# Patient Record
Sex: FEMALE | Race: BLACK OR AFRICAN AMERICAN | NOT HISPANIC OR LATINO | Employment: FULL TIME | URBAN - METROPOLITAN AREA
[De-identification: names, ages, dates, MRNs, and addresses within clinical notes are randomized per-mention and may not be internally consistent; named-entity substitution may affect disease eponyms.]

---

## 2018-01-22 ENCOUNTER — HOSPITAL ENCOUNTER (EMERGENCY)
Facility: HOSPITAL | Age: 63
Discharge: HOME | End: 2018-01-22
Attending: EMERGENCY MEDICINE | Admitting: EMERGENCY MEDICINE

## 2018-01-22 ENCOUNTER — APPOINTMENT (OUTPATIENT)
Dept: GENERAL RADIOLOGY | Facility: HOSPITAL | Age: 63
End: 2018-01-22

## 2018-01-22 VITALS
OXYGEN SATURATION: 99 % | HEART RATE: 60 BPM | HEIGHT: 68 IN | WEIGHT: 158 LBS | RESPIRATION RATE: 18 BRPM | SYSTOLIC BLOOD PRESSURE: 121 MMHG | TEMPERATURE: 97.4 F | DIASTOLIC BLOOD PRESSURE: 73 MMHG | BODY MASS INDEX: 23.95 KG/M2

## 2018-01-22 DIAGNOSIS — R07.89 LEFT-SIDED CHEST WALL PAIN: Primary | ICD-10-CM

## 2018-01-22 LAB
ALBUMIN SERPL-MCNC: 4 G/DL (ref 3.5–5.2)
ALBUMIN/GLOB SERPL: 1 G/DL
ALP SERPL-CCNC: 103 U/L (ref 39–117)
ALT SERPL W P-5'-P-CCNC: 20 U/L (ref 1–33)
ANION GAP SERPL CALCULATED.3IONS-SCNC: 12.2 MMOL/L
AST SERPL-CCNC: 18 U/L (ref 1–32)
BASOPHILS # BLD AUTO: 0.01 10*3/MM3 (ref 0–0.2)
BASOPHILS NFR BLD AUTO: 0.2 % (ref 0–1.5)
BILIRUB SERPL-MCNC: 0.4 MG/DL (ref 0.1–1.2)
BUN BLD-MCNC: 12 MG/DL (ref 8–23)
BUN/CREAT SERPL: 16 (ref 7–25)
CALCIUM SPEC-SCNC: 9.3 MG/DL (ref 8.6–10.5)
CHLORIDE SERPL-SCNC: 106 MMOL/L (ref 98–107)
CO2 SERPL-SCNC: 23.8 MMOL/L (ref 22–29)
CREAT BLD-MCNC: 0.75 MG/DL (ref 0.57–1)
DEPRECATED RDW RBC AUTO: 46.2 FL (ref 37–54)
EOSINOPHIL # BLD AUTO: 0.09 10*3/MM3 (ref 0–0.7)
EOSINOPHIL NFR BLD AUTO: 1.9 % (ref 0.3–6.2)
ERYTHROCYTE [DISTWIDTH] IN BLOOD BY AUTOMATED COUNT: 13.9 % (ref 11.7–13)
GFR SERPL CREATININE-BSD FRML MDRD: 78 ML/MIN/1.73
GFR SERPL CREATININE-BSD FRML MDRD: 95 ML/MIN/1.73
GLOBULIN UR ELPH-MCNC: 3.9 GM/DL
GLUCOSE BLD-MCNC: 96 MG/DL (ref 65–99)
HCT VFR BLD AUTO: 38.4 % (ref 35.6–45.5)
HGB BLD-MCNC: 12.5 G/DL (ref 11.9–15.5)
HOLD SPECIMEN: NORMAL
HOLD SPECIMEN: NORMAL
IMM GRANULOCYTES # BLD: 0 10*3/MM3 (ref 0–0.03)
IMM GRANULOCYTES NFR BLD: 0 % (ref 0–0.5)
LYMPHOCYTES # BLD AUTO: 1.39 10*3/MM3 (ref 0.9–4.8)
LYMPHOCYTES NFR BLD AUTO: 29.1 % (ref 19.6–45.3)
MCH RBC QN AUTO: 29.8 PG (ref 26.9–32)
MCHC RBC AUTO-ENTMCNC: 32.6 G/DL (ref 32.4–36.3)
MCV RBC AUTO: 91.4 FL (ref 80.5–98.2)
MONOCYTES # BLD AUTO: 0.31 10*3/MM3 (ref 0.2–1.2)
MONOCYTES NFR BLD AUTO: 6.5 % (ref 5–12)
NEUTROPHILS # BLD AUTO: 2.98 10*3/MM3 (ref 1.9–8.1)
NEUTROPHILS NFR BLD AUTO: 62.3 % (ref 42.7–76)
PLATELET # BLD AUTO: 184 10*3/MM3 (ref 140–500)
PMV BLD AUTO: 10.6 FL (ref 6–12)
POTASSIUM BLD-SCNC: 3.8 MMOL/L (ref 3.5–5.2)
PROT SERPL-MCNC: 7.9 G/DL (ref 6–8.5)
RBC # BLD AUTO: 4.2 10*6/MM3 (ref 3.9–5.2)
SODIUM BLD-SCNC: 142 MMOL/L (ref 136–145)
TROPONIN T SERPL-MCNC: <0.01 NG/ML (ref 0–0.03)
WBC NRBC COR # BLD: 4.78 10*3/MM3 (ref 4.5–10.7)
WHOLE BLOOD HOLD SPECIMEN: NORMAL
WHOLE BLOOD HOLD SPECIMEN: NORMAL

## 2018-01-22 PROCEDURE — 80053 COMPREHEN METABOLIC PANEL: CPT | Performed by: EMERGENCY MEDICINE

## 2018-01-22 PROCEDURE — 84484 ASSAY OF TROPONIN QUANT: CPT | Performed by: EMERGENCY MEDICINE

## 2018-01-22 PROCEDURE — 93010 ELECTROCARDIOGRAM REPORT: CPT | Performed by: INTERNAL MEDICINE

## 2018-01-22 PROCEDURE — 93005 ELECTROCARDIOGRAM TRACING: CPT

## 2018-01-22 PROCEDURE — 85025 COMPLETE CBC W/AUTO DIFF WBC: CPT | Performed by: EMERGENCY MEDICINE

## 2018-01-22 PROCEDURE — 99283 EMERGENCY DEPT VISIT LOW MDM: CPT

## 2018-01-22 PROCEDURE — 71046 X-RAY EXAM CHEST 2 VIEWS: CPT

## 2018-01-22 PROCEDURE — 36415 COLL VENOUS BLD VENIPUNCTURE: CPT | Performed by: EMERGENCY MEDICINE

## 2018-01-22 PROCEDURE — 93005 ELECTROCARDIOGRAM TRACING: CPT | Performed by: EMERGENCY MEDICINE

## 2018-01-22 RX ORDER — ASPIRIN 325 MG
325 TABLET ORAL ONCE
Status: DISCONTINUED | OUTPATIENT
Start: 2018-01-22 | End: 2018-01-22 | Stop reason: HOSPADM

## 2018-01-22 RX ORDER — CYCLOBENZAPRINE HCL 10 MG
10 TABLET ORAL 3 TIMES DAILY
Qty: 90 TABLET | Refills: 0 | Status: SHIPPED | OUTPATIENT
Start: 2018-01-22

## 2018-01-22 RX ORDER — HYDROCODONE BITARTRATE AND ACETAMINOPHEN 5; 325 MG/1; MG/1
1 TABLET ORAL EVERY 6 HOURS PRN
Qty: 20 TABLET | Refills: 0 | Status: SHIPPED | OUTPATIENT
Start: 2018-01-22

## 2018-01-22 RX ORDER — SODIUM CHLORIDE 0.9 % (FLUSH) 0.9 %
10 SYRINGE (ML) INJECTION AS NEEDED
Status: DISCONTINUED | OUTPATIENT
Start: 2018-01-22 | End: 2018-01-22 | Stop reason: HOSPADM

## 2018-01-22 NOTE — ED TRIAGE NOTES
"Patient to er from home with c/o sudden onset of back pain and pain moving into her left side of chest,patient reported no injury to cause this. Patient reported the pain comes in waves.\"   "

## 2018-01-23 NOTE — ED PROVIDER NOTES
EMERGENCY DEPARTMENT ENCOUNTER    CHIEF COMPLAINT  Chief Complaint: back pain  History given by: pt  History limited by: nothing  Room Number: Room/bed info not found  PMD: Vera River MD      HPI:  Pt is a 62 y.o. female who presents complaining of a pounding back pain that began this morning and radiated to her chest. It lasted for around 2 hours. She reports she has had muscle spasms in the past but this is different.    Duration:  Half day  Onset: gradual  Timing: episodic  Location: back  Radiation: to the chest  Quality: pounding  Intensity/Severity: moderate  Progression: improved  Associated Symptoms: chest pain  Aggravating Factors: none  Alleviating Factors: none  Previous Episodes: none  Treatment before arrival: none    PAST MEDICAL HISTORY  Active Ambulatory Problems     Diagnosis Date Noted   • No Active Ambulatory Problems     Resolved Ambulatory Problems     Diagnosis Date Noted   • No Resolved Ambulatory Problems     Past Medical History:   Diagnosis Date   • Vertigo        PAST SURGICAL HISTORY  Past Surgical History:   Procedure Laterality Date   • BREAST SURGERY     • FOOT SURGERY Right    • HYSTERECTOMY         FAMILY HISTORY  History reviewed. No pertinent family history.    SOCIAL HISTORY  Social History     Social History   • Marital status: N/A     Spouse name: N/A   • Number of children: N/A   • Years of education: N/A     Occupational History   • Not on file.     Social History Main Topics   • Smoking status: Never Smoker   • Smokeless tobacco: Never Used   • Alcohol use No   • Drug use: No   • Sexual activity: Not on file     Other Topics Concern   • Not on file     Social History Narrative   • No narrative on file       ALLERGIES  Review of patient's allergies indicates no known allergies.    REVIEW OF SYSTEMS  Review of Systems   Constitutional: Negative for fever.   HENT: Negative for sore throat.    Eyes: Negative.    Respiratory: Negative for cough and shortness of breath.     Cardiovascular: Positive for chest pain.   Gastrointestinal: Negative for abdominal pain, diarrhea and vomiting.   Genitourinary: Negative for dysuria.   Musculoskeletal: Positive for back pain. Negative for neck pain.   Skin: Negative for rash.   Allergic/Immunologic: Negative.    Neurological: Negative for weakness, numbness and headaches.   Hematological: Negative.    Psychiatric/Behavioral: Negative.    All other systems reviewed and are negative.      PHYSICAL EXAM  ED Triage Vitals   Temp Heart Rate Resp BP SpO2   01/22/18 1338 01/22/18 1338 01/22/18 1352 01/22/18 1352 01/22/18 1338   97.4 °F (36.3 °C) 74 20 117/63 97 %      Temp src Heart Rate Source Patient Position BP Location FiO2 (%)   01/22/18 1338 01/22/18 1338 -- -- --   Tympanic Monitor          Physical Exam   Constitutional: She is oriented to person, place, and time and well-developed, well-nourished, and in no distress. No distress.   HENT:   Head: Normocephalic and atraumatic.   Eyes: EOM are normal. Pupils are equal, round, and reactive to light.   Neck: Normal range of motion. Neck supple.   Cardiovascular: Normal rate, regular rhythm and normal heart sounds.    Pulmonary/Chest: Effort normal and breath sounds normal. No respiratory distress.   Abdominal: Soft. There is no tenderness. There is no rebound and no guarding.   Musculoskeletal: Normal range of motion. She exhibits no edema.        Thoracic back: She exhibits tenderness (along left spinous processes) and spasm (mild paraspinal muscle spasms).   Neurological: She is alert and oriented to person, place, and time. She has normal sensation and normal strength.   Skin: Skin is warm and dry. No rash noted.   Psychiatric: Mood and affect normal.   Nursing note and vitals reviewed.      LAB RESULTS  Lab Results (last 24 hours)     Procedure Component Value Units Date/Time    CBC & Differential [[id number]] Collected:  01/22/18 1429    Specimen:  Blood Updated:  01/22/18 1446    Narrative:        The following orders were created for panel order CBC & Differential.  Procedure                               Abnormality         Status                     ---------                               -----------         ------                     CBC Auto Differential[[id number]]        Abnormal            Final result                 Please view results for these tests on the individual orders.    Comprehensive Metabolic Panel [[id number]] Collected:  01/22/18 1429    Specimen:  Blood Updated:  01/22/18 1547     Glucose 96 mg/dL      BUN 12 mg/dL      Creatinine 0.75 mg/dL      Sodium 142 mmol/L      Potassium 3.8 mmol/L      Chloride 106 mmol/L      CO2 23.8 mmol/L      Calcium 9.3 mg/dL      Total Protein 7.9 g/dL      Albumin 4.00 g/dL      ALT (SGPT) 20 U/L      AST (SGOT) 18 U/L      Alkaline Phosphatase 103 U/L      Total Bilirubin 0.4 mg/dL      eGFR Non African Amer 78 mL/min/1.73      eGFR  African Amer 95 mL/min/1.73      Globulin 3.9 gm/dL      A/G Ratio 1.0 g/dL      BUN/Creatinine Ratio 16.0     Anion Gap 12.2 mmol/L     Troponin [[id number]]  (Normal) Collected:  01/22/18 1429    Specimen:  Blood Updated:  01/22/18 1545     Troponin T <0.010 ng/mL     Narrative:       Troponin T Reference Ranges:  Less than 0.03 ng/mL:    Negative for AMI  0.03 to 0.09 ng/mL:      Indeterminant for AMI  Greater than 0.09 ng/mL: Positive for AMI    CBC Auto Differential [[id number]]  (Abnormal) Collected:  01/22/18 1429    Specimen:  Blood Updated:  01/22/18 1446     WBC 4.78 10*3/mm3      RBC 4.20 10*6/mm3      Hemoglobin 12.5 g/dL      Hematocrit 38.4 %      MCV 91.4 fL      MCH 29.8 pg      MCHC 32.6 g/dL      RDW 13.9 (H) %      RDW-SD 46.2 fl      MPV 10.6 fL      Platelets 184 10*3/mm3      Neutrophil % 62.3 %      Lymphocyte % 29.1 %      Monocyte % 6.5 %      Eosinophil % 1.9 %      Basophil % 0.2 %      Immature Grans % 0.0 %      Neutrophils, Absolute 2.98 10*3/mm3      Lymphocytes, Absolute 1.39 10*3/mm3       Monocytes, Absolute 0.31 10*3/mm3      Eosinophils, Absolute 0.09 10*3/mm3      Basophils, Absolute 0.01 10*3/mm3      Immature Grans, Absolute 0.00 10*3/mm3           I ordered the above labs and reviewed the results    RADIOLOGY  XR Chest 2 View   Final Result   The lungs are well-expanded and clear and the heart and hilar  structures are normal. There is no acute disease.        I ordered the above noted radiological studies. Interpreted by radiologist. Reviewed by me in PACS.       PROCEDURES  Procedures    EKG           EKG time: 1930  Rhythm/Rate: NSR 55  P waves and GA: normal  QRS, axis: normal   ST and T waves: normal     Interpreted Contemporaneously by me, independently viewed  unchanged compared to prior 1/22/2018      PROGRESS AND CONSULTS  ED Course     1356 - Ordered labs, EKG, and CXR for further evaluation.    1947 - Notified pt of normal labs, imaging, and EKG. She declines a repeat Troponin. Discussed plan to discharge the pt with pain medication for back spasms. She can follow up with a physical therapist. Pt understands and agrees with plan, all questions addressed.      MEDICAL DECISION MAKING  Results were reviewed/discussed with the patient and they were also made aware of online access. Pt also made aware that some labs, such as cultures, will not be resulted during ER visit and follow up with PMD is necessary.     MDM  Number of Diagnoses or Management Options  Left-sided chest wall pain:      Amount and/or Complexity of Data Reviewed  Clinical lab tests: ordered and reviewed (unremarkable)  Tests in the radiology section of CPT®: ordered and reviewed (CXR - nothing acute)  Tests in the medicine section of CPT®: reviewed and ordered (See EKG procedure note)          HEART Score (for prediction of 6-week risk of major adverse cardiac event) reviewed and/or performed as part of the patient evaluation and treatment planning process.  The result associated with this review/performance is:  2      DIAGNOSIS  Final diagnoses:   Left-sided chest wall pain       DISPOSITION  DISCHARGE    Patient discharged in stable condition.    Reviewed implications of results, diagnosis, meds, responsibility to follow up, warning signs and symptoms of possible worsening, potential complications and reasons to return to ER.    Patient/Family voiced understanding of above instructions.    Discussed plan for discharge, as there is no emergent indication for admission.  Pt/family is agreeable and understands need for follow up and repeat testing.  Pt is aware that discharge does not mean that nothing is wrong but it indicates no emergency is present that requires admission and they must continue care with follow-up as given below or physician of their choice.     FOLLOW-UP  Vera River MD  [address]  [place] [address]  [phone]    Schedule an appointment as soon as possible for a visit           Medication List      New Prescriptions          cyclobenzaprine 10 MG tablet   Commonly known as:  FLEXERIL   Take 1 tablet by mouth 3 (Three) Times a Day.       HYDROcodone-acetaminophen 5-325 MG per tablet   Commonly known as:  NORCO   Take 1 tablet by mouth Every 6 (Six) Hours As Needed for Moderate Pain .               Latest Documented Vital Signs:  As of 7:54 PM  BP- 117/63 HR- 74 Temp- 97.4 °F (36.3 °C) (Tympanic) O2 sat- 97%    --  Documentation assistance provided by chris Fernandez for Dr Pope.  Information recorded by the scribe was done at my direction and has been verified and validated by me.     Gerardo Fernandez  01/22/18 1957       Shay Pope MD  01/22/18 6028

## 2024-08-16 ENCOUNTER — TRANSCRIBE ORDERS (OUTPATIENT)
Dept: ADMINISTRATIVE | Facility: HOSPITAL | Age: 69
End: 2024-08-16
Payer: COMMERCIAL

## 2024-08-16 DIAGNOSIS — Z12.31 SCREENING MAMMOGRAM FOR BREAST CANCER: Primary | ICD-10-CM

## 2024-12-17 ENCOUNTER — APPOINTMENT (OUTPATIENT)
Dept: CT IMAGING | Facility: HOSPITAL | Age: 69
End: 2024-12-17
Payer: COMMERCIAL

## 2024-12-17 ENCOUNTER — APPOINTMENT (OUTPATIENT)
Dept: MRI IMAGING | Facility: HOSPITAL | Age: 69
End: 2024-12-17
Payer: COMMERCIAL

## 2024-12-17 ENCOUNTER — HOSPITAL ENCOUNTER (OUTPATIENT)
Facility: HOSPITAL | Age: 69
Setting detail: OBSERVATION
Discharge: HOME | End: 2024-12-18
Attending: EMERGENCY MEDICINE | Admitting: EMERGENCY MEDICINE
Payer: COMMERCIAL

## 2024-12-17 DIAGNOSIS — H81.10 BENIGN PAROXYSMAL POSITIONAL VERTIGO, UNSPECIFIED LATERALITY: ICD-10-CM

## 2024-12-17 DIAGNOSIS — R07.9 CHEST PAIN, UNSPECIFIED TYPE: ICD-10-CM

## 2024-12-17 DIAGNOSIS — R55 NEAR SYNCOPE: Primary | ICD-10-CM

## 2024-12-17 PROBLEM — R42 VERTIGO: Status: ACTIVE | Noted: 2024-12-17

## 2024-12-17 LAB
ALBUMIN SERPL-MCNC: 4 G/DL (ref 3.5–5.2)
ALBUMIN/GLOB SERPL: 1.3 G/DL
ALP SERPL-CCNC: 130 U/L (ref 39–117)
ALT SERPL W P-5'-P-CCNC: 21 U/L (ref 1–33)
ANION GAP SERPL CALCULATED.3IONS-SCNC: 7 MMOL/L (ref 5–15)
AST SERPL-CCNC: 20 U/L (ref 1–32)
B PARAPERT DNA SPEC QL NAA+PROBE: NOT DETECTED
B PERT DNA SPEC QL NAA+PROBE: NOT DETECTED
BASOPHILS # BLD AUTO: 0.02 10*3/MM3 (ref 0–0.2)
BASOPHILS NFR BLD AUTO: 0.5 % (ref 0–1.5)
BILIRUB SERPL-MCNC: 0.5 MG/DL (ref 0–1.2)
BILIRUB UR QL STRIP: NEGATIVE
BUN SERPL-MCNC: 11 MG/DL (ref 8–23)
BUN/CREAT SERPL: 17.5 (ref 7–25)
C PNEUM DNA NPH QL NAA+NON-PROBE: NOT DETECTED
CALCIUM SPEC-SCNC: 8.8 MG/DL (ref 8.6–10.5)
CHLORIDE SERPL-SCNC: 109 MMOL/L (ref 98–107)
CLARITY UR: CLEAR
CO2 SERPL-SCNC: 27 MMOL/L (ref 22–29)
COLOR UR: YELLOW
CREAT SERPL-MCNC: 0.63 MG/DL (ref 0.57–1)
D-LACTATE SERPL-SCNC: 2 MMOL/L (ref 0.5–2)
DEPRECATED RDW RBC AUTO: 43.9 FL (ref 37–54)
EGFRCR SERPLBLD CKD-EPI 2021: 96.8 ML/MIN/1.73
EOSINOPHIL # BLD AUTO: 0.06 10*3/MM3 (ref 0–0.4)
EOSINOPHIL NFR BLD AUTO: 1.4 % (ref 0.3–6.2)
ERYTHROCYTE [DISTWIDTH] IN BLOOD BY AUTOMATED COUNT: 13.3 % (ref 12.3–15.4)
FLUAV SUBTYP SPEC NAA+PROBE: NOT DETECTED
FLUBV RNA ISLT QL NAA+PROBE: NOT DETECTED
GEN 5 1HR TROPONIN T REFLEX: <6 NG/L
GLOBULIN UR ELPH-MCNC: 3 GM/DL
GLUCOSE SERPL-MCNC: 158 MG/DL (ref 65–99)
GLUCOSE UR STRIP-MCNC: NEGATIVE MG/DL
HADV DNA SPEC NAA+PROBE: NOT DETECTED
HCOV 229E RNA SPEC QL NAA+PROBE: NOT DETECTED
HCOV HKU1 RNA SPEC QL NAA+PROBE: NOT DETECTED
HCOV NL63 RNA SPEC QL NAA+PROBE: NOT DETECTED
HCOV OC43 RNA SPEC QL NAA+PROBE: NOT DETECTED
HCT VFR BLD AUTO: 36.8 % (ref 34–46.6)
HGB BLD-MCNC: 12.3 G/DL (ref 12–15.9)
HGB UR QL STRIP.AUTO: NEGATIVE
HMPV RNA NPH QL NAA+NON-PROBE: NOT DETECTED
HOLD SPECIMEN: NORMAL
HOLD SPECIMEN: NORMAL
HPIV1 RNA ISLT QL NAA+PROBE: NOT DETECTED
HPIV2 RNA SPEC QL NAA+PROBE: NOT DETECTED
HPIV3 RNA NPH QL NAA+PROBE: NOT DETECTED
HPIV4 P GENE NPH QL NAA+PROBE: NOT DETECTED
IMM GRANULOCYTES # BLD AUTO: 0.01 10*3/MM3 (ref 0–0.05)
IMM GRANULOCYTES NFR BLD AUTO: 0.2 % (ref 0–0.5)
KETONES UR QL STRIP: NEGATIVE
LEUKOCYTE ESTERASE UR QL STRIP.AUTO: NEGATIVE
LIPASE SERPL-CCNC: 20 U/L (ref 13–60)
LYMPHOCYTES # BLD AUTO: 1.43 10*3/MM3 (ref 0.7–3.1)
LYMPHOCYTES NFR BLD AUTO: 32.3 % (ref 19.6–45.3)
M PNEUMO IGG SER IA-ACNC: NOT DETECTED
MCH RBC QN AUTO: 30.2 PG (ref 26.6–33)
MCHC RBC AUTO-ENTMCNC: 33.4 G/DL (ref 31.5–35.7)
MCV RBC AUTO: 90.4 FL (ref 79–97)
MONOCYTES # BLD AUTO: 0.29 10*3/MM3 (ref 0.1–0.9)
MONOCYTES NFR BLD AUTO: 6.5 % (ref 5–12)
NEUTROPHILS NFR BLD AUTO: 2.62 10*3/MM3 (ref 1.7–7)
NEUTROPHILS NFR BLD AUTO: 59.1 % (ref 42.7–76)
NITRITE UR QL STRIP: NEGATIVE
NRBC BLD AUTO-RTO: 0 /100 WBC (ref 0–0.2)
PH UR STRIP.AUTO: 8.5 [PH] (ref 5–8)
PLATELET # BLD AUTO: 197 10*3/MM3 (ref 140–450)
PMV BLD AUTO: 10 FL (ref 6–12)
POTASSIUM SERPL-SCNC: 3.4 MMOL/L (ref 3.5–5.2)
PROT SERPL-MCNC: 7 G/DL (ref 6–8.5)
PROT UR QL STRIP: NEGATIVE
QT INTERVAL: 426 MS
QTC INTERVAL: 443 MS
RBC # BLD AUTO: 4.07 10*6/MM3 (ref 3.77–5.28)
RHINOVIRUS RNA SPEC NAA+PROBE: NOT DETECTED
RSV RNA NPH QL NAA+NON-PROBE: NOT DETECTED
SARS-COV-2 RNA NPH QL NAA+NON-PROBE: NOT DETECTED
SODIUM SERPL-SCNC: 143 MMOL/L (ref 136–145)
SP GR UR STRIP: 1.01 (ref 1–1.03)
TROPONIN T NUMERIC DELTA: NORMAL
TROPONIN T SERPL HS-MCNC: <6 NG/L
UROBILINOGEN UR QL STRIP: ABNORMAL
WBC NRBC COR # BLD AUTO: 4.43 10*3/MM3 (ref 3.4–10.8)
WHOLE BLOOD HOLD COAG: NORMAL
WHOLE BLOOD HOLD SPECIMEN: NORMAL

## 2024-12-17 PROCEDURE — 36415 COLL VENOUS BLD VENIPUNCTURE: CPT | Performed by: EMERGENCY MEDICINE

## 2024-12-17 PROCEDURE — 70551 MRI BRAIN STEM W/O DYE: CPT

## 2024-12-17 PROCEDURE — G0378 HOSPITAL OBSERVATION PER HR: HCPCS

## 2024-12-17 PROCEDURE — 84484 ASSAY OF TROPONIN QUANT: CPT | Performed by: EMERGENCY MEDICINE

## 2024-12-17 PROCEDURE — 0202U NFCT DS 22 TRGT SARS-COV-2: CPT | Performed by: PHYSICIAN ASSISTANT

## 2024-12-17 PROCEDURE — 93010 ELECTROCARDIOGRAM REPORT: CPT | Performed by: INTERNAL MEDICINE

## 2024-12-17 PROCEDURE — 80053 COMPREHEN METABOLIC PANEL: CPT | Performed by: EMERGENCY MEDICINE

## 2024-12-17 PROCEDURE — 99204 OFFICE O/P NEW MOD 45 MIN: CPT

## 2024-12-17 PROCEDURE — 85025 COMPLETE CBC W/AUTO DIFF WBC: CPT | Performed by: EMERGENCY MEDICINE

## 2024-12-17 PROCEDURE — 96374 THER/PROPH/DIAG INJ IV PUSH: CPT

## 2024-12-17 PROCEDURE — 25010000002 ONDANSETRON PER 1 MG: Performed by: EMERGENCY MEDICINE

## 2024-12-17 PROCEDURE — 83605 ASSAY OF LACTIC ACID: CPT | Performed by: EMERGENCY MEDICINE

## 2024-12-17 PROCEDURE — 81003 URINALYSIS AUTO W/O SCOPE: CPT | Performed by: EMERGENCY MEDICINE

## 2024-12-17 PROCEDURE — 25810000003 SODIUM CHLORIDE 0.9 % SOLUTION: Performed by: PHYSICIAN ASSISTANT

## 2024-12-17 PROCEDURE — 70450 CT HEAD/BRAIN W/O DYE: CPT

## 2024-12-17 PROCEDURE — 99285 EMERGENCY DEPT VISIT HI MDM: CPT

## 2024-12-17 PROCEDURE — 83690 ASSAY OF LIPASE: CPT | Performed by: EMERGENCY MEDICINE

## 2024-12-17 PROCEDURE — 93005 ELECTROCARDIOGRAM TRACING: CPT | Performed by: PHYSICIAN ASSISTANT

## 2024-12-17 RX ORDER — ACETAMINOPHEN 160 MG/5ML
650 SOLUTION ORAL EVERY 4 HOURS PRN
Status: DISCONTINUED | OUTPATIENT
Start: 2024-12-17 | End: 2024-12-18 | Stop reason: HOSPADM

## 2024-12-17 RX ORDER — SODIUM CHLORIDE 0.9 % (FLUSH) 0.9 %
10 SYRINGE (ML) INJECTION AS NEEDED
Status: DISCONTINUED | OUTPATIENT
Start: 2024-12-17 | End: 2024-12-18 | Stop reason: HOSPADM

## 2024-12-17 RX ORDER — SODIUM CHLORIDE 9 MG/ML
40 INJECTION, SOLUTION INTRAVENOUS AS NEEDED
Status: DISCONTINUED | OUTPATIENT
Start: 2024-12-17 | End: 2024-12-18 | Stop reason: HOSPADM

## 2024-12-17 RX ORDER — AMOXICILLIN 250 MG
2 CAPSULE ORAL 2 TIMES DAILY PRN
Status: DISCONTINUED | OUTPATIENT
Start: 2024-12-17 | End: 2024-12-18 | Stop reason: HOSPADM

## 2024-12-17 RX ORDER — ACETAMINOPHEN 325 MG/1
650 TABLET ORAL EVERY 4 HOURS PRN
Status: DISCONTINUED | OUTPATIENT
Start: 2024-12-17 | End: 2024-12-18 | Stop reason: HOSPADM

## 2024-12-17 RX ORDER — ONDANSETRON 4 MG/1
4 TABLET, ORALLY DISINTEGRATING ORAL EVERY 6 HOURS PRN
Status: DISCONTINUED | OUTPATIENT
Start: 2024-12-17 | End: 2024-12-18 | Stop reason: HOSPADM

## 2024-12-17 RX ORDER — POLYETHYLENE GLYCOL 3350 17 G/17G
17 POWDER, FOR SOLUTION ORAL DAILY PRN
Status: DISCONTINUED | OUTPATIENT
Start: 2024-12-17 | End: 2024-12-18 | Stop reason: HOSPADM

## 2024-12-17 RX ORDER — BUSPIRONE HYDROCHLORIDE 15 MG/1
15 TABLET ORAL 3 TIMES DAILY
COMMUNITY

## 2024-12-17 RX ORDER — SODIUM CHLORIDE 0.9 % (FLUSH) 0.9 %
10 SYRINGE (ML) INJECTION EVERY 12 HOURS SCHEDULED
Status: DISCONTINUED | OUTPATIENT
Start: 2024-12-17 | End: 2024-12-18 | Stop reason: HOSPADM

## 2024-12-17 RX ORDER — ACETAMINOPHEN 650 MG/1
650 SUPPOSITORY RECTAL EVERY 4 HOURS PRN
Status: DISCONTINUED | OUTPATIENT
Start: 2024-12-17 | End: 2024-12-18 | Stop reason: HOSPADM

## 2024-12-17 RX ORDER — ACETAMINOPHEN 500 MG
1000 TABLET ORAL ONCE
Status: COMPLETED | OUTPATIENT
Start: 2024-12-17 | End: 2024-12-17

## 2024-12-17 RX ORDER — ONDANSETRON 2 MG/ML
4 INJECTION INTRAMUSCULAR; INTRAVENOUS ONCE
Status: COMPLETED | OUTPATIENT
Start: 2024-12-17 | End: 2024-12-17

## 2024-12-17 RX ORDER — ONDANSETRON 2 MG/ML
4 INJECTION INTRAMUSCULAR; INTRAVENOUS EVERY 6 HOURS PRN
Status: DISCONTINUED | OUTPATIENT
Start: 2024-12-17 | End: 2024-12-18 | Stop reason: HOSPADM

## 2024-12-17 RX ORDER — NITROGLYCERIN 0.4 MG/1
0.4 TABLET SUBLINGUAL
Status: DISCONTINUED | OUTPATIENT
Start: 2024-12-17 | End: 2024-12-18 | Stop reason: HOSPADM

## 2024-12-17 RX ORDER — BISACODYL 10 MG
10 SUPPOSITORY, RECTAL RECTAL DAILY PRN
Status: DISCONTINUED | OUTPATIENT
Start: 2024-12-17 | End: 2024-12-18 | Stop reason: HOSPADM

## 2024-12-17 RX ORDER — ESCITALOPRAM OXALATE 5 MG/1
5 TABLET ORAL DAILY
COMMUNITY

## 2024-12-17 RX ORDER — ATORVASTATIN CALCIUM 10 MG/1
10 TABLET, FILM COATED ORAL DAILY
COMMUNITY

## 2024-12-17 RX ORDER — BUSPIRONE HYDROCHLORIDE 10 MG/1
15 TABLET ORAL 3 TIMES DAILY
Status: DISCONTINUED | OUTPATIENT
Start: 2024-12-17 | End: 2024-12-18 | Stop reason: HOSPADM

## 2024-12-17 RX ORDER — POTASSIUM CHLORIDE 750 MG/1
40 TABLET, FILM COATED, EXTENDED RELEASE ORAL ONCE
Status: COMPLETED | OUTPATIENT
Start: 2024-12-17 | End: 2024-12-17

## 2024-12-17 RX ORDER — BISACODYL 5 MG/1
5 TABLET, DELAYED RELEASE ORAL DAILY PRN
Status: DISCONTINUED | OUTPATIENT
Start: 2024-12-17 | End: 2024-12-18 | Stop reason: HOSPADM

## 2024-12-17 RX ORDER — ATORVASTATIN CALCIUM 10 MG/1
10 TABLET, FILM COATED ORAL DAILY
Status: DISCONTINUED | OUTPATIENT
Start: 2024-12-18 | End: 2024-12-18 | Stop reason: HOSPADM

## 2024-12-17 RX ORDER — ASPIRIN 81 MG/1
324 TABLET, CHEWABLE ORAL ONCE
Status: DISCONTINUED | OUTPATIENT
Start: 2024-12-17 | End: 2024-12-18 | Stop reason: HOSPADM

## 2024-12-17 RX ORDER — ESCITALOPRAM OXALATE 5 MG/1
5 TABLET ORAL DAILY
Status: DISCONTINUED | OUTPATIENT
Start: 2024-12-18 | End: 2024-12-18 | Stop reason: HOSPADM

## 2024-12-17 RX ADMIN — BUSPIRONE HYDROCHLORIDE 15 MG: 10 TABLET ORAL at 22:48

## 2024-12-17 RX ADMIN — Medication 10 ML: at 20:10

## 2024-12-17 RX ADMIN — ONDANSETRON 4 MG: 2 INJECTION INTRAMUSCULAR; INTRAVENOUS at 14:05

## 2024-12-17 RX ADMIN — POTASSIUM CHLORIDE 40 MEQ: 750 TABLET, EXTENDED RELEASE ORAL at 16:51

## 2024-12-17 RX ADMIN — SODIUM CHLORIDE 1000 ML: 9 INJECTION, SOLUTION INTRAVENOUS at 11:05

## 2024-12-17 RX ADMIN — ACETAMINOPHEN 1000 MG: 500 TABLET, FILM COATED ORAL at 16:20

## 2024-12-17 NOTE — ED NOTES
Nursing report ED to floor  Christina Traore  68 y.o.  female    HPI :  HPI  Stated Reason for Visit: vomiting    Chief Complaint  Chief Complaint   Patient presents with    Vomiting       Admitting doctor:   Jose L Santana MD    Admitting diagnosis:   The primary encounter diagnosis was Near syncope. Diagnoses of Chest pain, unspecified type and Benign paroxysmal positional vertigo, unspecified laterality were also pertinent to this visit.    Code status:   Current Code Status       Date Active Code Status Order ID Comments User Context       12/17/2024 1620 CPR (Attempt to Resuscitate) [id number]  Maria A Palmer PA-C ED        Question Answer    Code Status (Patient has no pulse and is not breathing) CPR (Attempt to Resuscitate)    Medical Interventions (Patient has pulse or is breathing) Full Support    Level Of Support Discussed With Patient                    Allergies:   Patient has no known allergies.    Isolation:   No active isolations    Intake and Output  No intake or output data in the 24 hours ending 12/17/24 1630    Weight:       12/17/24  0920   Weight: 72.6 kg (160 lb)       Most recent vitals:   Vitals:    12/17/24 1354 12/17/24 1402 12/17/24 1431 12/17/24 1501   BP: 137/76 142/90 139/79 133/73   Patient Position: Lying Standing     Pulse: 63 65 64 61   Resp:       Temp:       TempSrc:       SpO2:   96% 94%   Weight:       Height:           Active LDAs/IV Access:   Lines, Drains & Airways       Active LDAs       Name Placement date Placement time Site Days    Peripheral IV 12/17/24 0918 Right Forearm 12/17/24  0918  Forearm  less than 1                    Labs (abnormal labs have a star):   Labs Reviewed   COMPREHENSIVE METABOLIC PANEL - Abnormal; Notable for the following components:       Result Value    Glucose 158 (*)     Potassium 3.4 (*)     Chloride 109 (*)     Alkaline Phosphatase 130 (*)     All other components within normal limits    Narrative:     GFR Categories in Chronic Kidney  Disease (CKD)      GFR Category          GFR (mL/min/1.73)    Interpretation  G1                     90 or greater         Normal or high (1)  G2                      60-89                Mild decrease (1)  G3a                   45-59                Mild to moderate decrease  G3b                   30-44                Moderate to severe decrease  G4                    15-29                Severe decrease  G5                    14 or less           Kidney failure          (1)In the absence of evidence of kidney disease, neither GFR category G1 or G2 fulfill the criteria for CKD.    eGFR calculation 2021 CKD-EPI creatinine equation, which does not include race as a factor   URINALYSIS W/ MICROSCOPIC IF INDICATED (NO CULTURE) - Abnormal; Notable for the following components:    pH, UA 8.5 (*)     All other components within normal limits    Narrative:     Urine microscopic not indicated.   LIPASE - Normal   LACTIC ACID, PLASMA - Normal   CBC WITH AUTO DIFFERENTIAL - Normal   TROPONIN - Normal    Narrative:     High Sensitive Troponin T Reference Range:  <14.0 ng/L- Negative Female for AMI  <22.0 ng/L- Negative Male for AMI  >=14 - Abnormal Female indicating possible myocardial injury.  >=22 - Abnormal Male indicating possible myocardial injury.   Clinicians would have to utilize clinical acumen, EKG, Troponin, and serial changes to determine if it is an Acute Myocardial Infarction or myocardial injury due to an underlying chronic condition.        RAINBOW DRAW    Narrative:     The following orders were created for panel order [place] Draw.  Procedure                               Abnormality         Status                     ---------                               -----------         ------                     Green Top (Gel)[[id number]]                                  Final result               Lavender Top[[id number]]                                     Final result               Gold Top - SST[[id number]]                                    Final result               Light Blue Top[[id number]]                                   Final result                 Please view results for these tests on the individual orders.   HIGH SENSITIVITIY TROPONIN T 1HR   CBC AND DIFFERENTIAL    Narrative:     The following orders were created for panel order CBC & Differential.  Procedure                               Abnormality         Status                     ---------                               -----------         ------                     CBC Auto Differential[[id number]]        Normal              Final result                 Please view results for these tests on the individual orders.   GREEN TOP   LAVENDER TOP   GOLD TOP - SST   LIGHT BLUE TOP       EKG:   ECG 12 Lead Syncope   Final Result   HEART RATE=65  bpm   RR Ikekyscx=415  ms   NV Interval=52  ms   P Horizontal Axis=-16  deg   P Front Axis=44  deg   QRSD Gjqpwfwh=716  ms   QT Ikijaenq=255  ms   IRkA=126  ms   QRS Axis=-15  deg   T Wave Axis=16  deg   - ABNORMAL ECG -   Sinus rhythm   Short NV interval   Left atrial enlargement   Borderline  intraventricular conduction delay   RSR' in V1 or V2, right VCD or RVH   No change   Electronically Signed By: Jermaine Verdin ([facility]) 2024-12-17 13:49:47   Date and Time of Study:2024-12-17 11:19:21          Meds given in ED:   Medications   sodium chloride 0.9 % flush 10 mL (has no administration in time range)   aspirin chewable tablet 324 mg (has no administration in time range)   sodium chloride 0.9 % flush 10 mL (has no administration in time range)   sodium chloride 0.9 % flush 10 mL (has no administration in time range)   sodium chloride 0.9 % infusion 40 mL (has no administration in time range)   ondansetron ODT (ZOFRAN-ODT) disintegrating tablet 4 mg (has no administration in time range)     Or   ondansetron (ZOFRAN) injection 4 mg (has no administration in time range)   nitroglycerin (NITROSTAT) SL tablet 0.4 mg (has no  administration in time range)   Potassium Replacement - Follow Nurse / BPA Driven Protocol (has no administration in time range)   Magnesium Standard Dose Replacement - Follow Nurse / BPA Driven Protocol (has no administration in time range)   Phosphorus Replacement - Follow Nurse / BPA Driven Protocol (has no administration in time range)   Calcium Replacement - Follow Nurse / BPA Driven Protocol (has no administration in time range)   acetaminophen (TYLENOL) tablet 650 mg (has no administration in time range)     Or   acetaminophen (TYLENOL) 160 MG/5ML oral solution 650 mg (has no administration in time range)     Or   acetaminophen (TYLENOL) suppository 650 mg (has no administration in time range)   sennosides-docusate (PERICOLACE) 8.6-50 MG per tablet 2 tablet (has no administration in time range)     And   polyethylene glycol (MIRALAX) packet 17 g (has no administration in time range)     And   bisacodyl (DULCOLAX) EC tablet 5 mg (has no administration in time range)     And   bisacodyl (DULCOLAX) suppository 10 mg (has no administration in time range)   potassium chloride (K-DUR,KLOR-CON) ER tablet 40 mEq (has no administration in time range)   sodium chloride 0.9 % bolus 1,000 mL (0 mL Intravenous Stopped 12/17/24 1232)   ondansetron (ZOFRAN) injection 4 mg (4 mg Intravenous Given 12/17/24 1405)   acetaminophen (TYLENOL) tablet 1,000 mg (1,000 mg Oral Given 12/17/24 1620)       Imaging results:  CT Head Without Contrast    Result Date: 12/17/2024  1. Normal head CT. The etiology of this patient's acute onset dizziness is not established on this exam. If neurologic symptoms warrant an MRI of the brain could be obtained for more complete assessment.  Radiation dose reduction techniques were utilized, including automated exposure control and exposure modulation based on body size.        Ambulatory status:   - x1    Social issues:   Social History     Socioeconomic History    Marital status: Single   Tobacco Use     Smoking status: Never    Smokeless tobacco: Never   Substance and Sexual Activity    Alcohol use: No    Drug use: No       Peripheral Neurovascular  Peripheral Neurovascular (Adult)  Peripheral Neurovascular WDL: .WDL except, neurovascular assessment upper, neurovascular assessment lower  LUE Neurovascular Assessment  Sensation LUE: tingling present  RUE Neurovascular Assessment  Sensation RUE: tingling present  LLE Neurovascular Assessment  Sensation LLE: tingling present  RLE Neurovascular Assessment  Sensation RLE: tingling present    Neuro Cognitive  Neuro Cognitive (Adult)  Cognitive/Neuro/Behavioral WDL: WDL  Bita Coma Scale  Best Eye Response: 4-->(E4) spontaneous  Best Motor Response: 6-->(M6) obeys commands  Best Verbal Response: 5-->(V5) oriented  Bita Coma Scale Score: 15    Learning  Learning Assessment  Learning Readiness and Ability: no barriers identified    Respiratory  Respiratory  Airway WDL: WDL  Respiratory WDL  Respiratory WDL: WDL    Abdominal Pain       Pain Assessments  Pain (Adult)  (0-10) Pain Rating: Rest: 0  (0-10) Pain Rating: Activity: 0    NIH Stroke Scale       Jermaine Perez RN  12/17/24 16:30 EST

## 2024-12-17 NOTE — ED NOTES
Pt arrives via EMS from work. Complains of N/V with dizziness that started this morning. Pt has a hx of vertigo. Pt received 4 mg Zofran IV PTA via EMS.

## 2024-12-17 NOTE — ED PROVIDER NOTES
EMERGENCY DEPARTMENT ENCOUNTER    Room Number:  127/1  Date of encounter:  12/17/2024  PCP: Estefany Dominique MD  Historian: Patient, family  Chronic or social conditions impacting care (social determinants of health): None    HPI:  Chief Complaint: Near syncope, vomiting  A complete HPI/ROS/PMH/PSH/SH/FH are unobtainable due to: Nothing    Context: Christina Traore is a 68 y.o. female with a history of depression, who presents to the ED c/o acute lightheadedness, weakness, vomiting prior to arrival.  Patient states that she awoke in her normal state of health this morning.  While at work around 8:00 she felt her legs become more weak and felt like she could pass out.  She eventually made it to a chair.  Patient states she has not had several episodes of vomiting.  She states this episode lasted approximately 1 hour.  She denies any associated shortness of breath, chest pain.  She denies any lateral weakness, dysarthria, aphasia she denies any headache.  EMS was called.  Patient was given Zofran en route.  At this time she states she feels better.  She denies any previous similar episodes.    Review of prior external notes (non-ED):   I reviewed primary care office visit from 9/25/2024.  Patient seen for routine medical care.    Review of prior external test results outside of this encounter:  Reviewed a CMP dated 9/25/2024.  Creatinine 0.75, potassium 4.2    PAST MEDICAL HISTORY  Active Ambulatory Problems     Diagnosis Date Noted    No Active Ambulatory Problems     Resolved Ambulatory Problems     Diagnosis Date Noted    No Resolved Ambulatory Problems     Past Medical History:   Diagnosis Date    Vertigo          PAST SURGICAL HISTORY  Past Surgical History:   Procedure Laterality Date    BREAST SURGERY      FOOT SURGERY Right     HYSTERECTOMY           FAMILY HISTORY  History reviewed. No pertinent family history.      SOCIAL HISTORY  Social History     Socioeconomic History    Marital status: Single   Tobacco  Use    Smoking status: Never     Passive exposure: Never    Smokeless tobacco: Never   Vaping Use    Vaping status: Never Used   Substance and Sexual Activity    Alcohol use: No    Drug use: No    Sexual activity: Defer         ALLERGIES  Patient has no known allergies.        REVIEW OF SYSTEMS  All systems reviewed and negative except for those discussed in HPI.       PHYSICAL EXAM    I have reviewed the triage vital signs and nursing notes.    ED Triage Vitals [12/17/24 0920]   Temp Heart Rate Resp BP SpO2   98.6 °F (37 °C) 67 16 135/62 97 %      Temp src Heart Rate Source Patient Position BP Location FiO2 (%)   Tympanic Monitor Lying -- --       Physical Exam  GENERAL: Alert, oriented, not distressed  HENT: head atraumatic, no nuchal rigidity  EYES: no scleral icterus, EOMI  CV: regular rhythm, regular rate, no murmur  RESPIRATORY: normal effort, CTA  ABDOMEN: soft, nontender  MUSCULOSKELETAL: no deformity, FROM, no calf swelling or tenderness  NEURO: alert, moves all extremities, follows commands, normal speech  SKIN: warm, dry        LAB RESULTS  Recent Results (from the past 24 hours)   Comprehensive Metabolic Panel    Collection Time: 12/17/24  9:30 AM    Specimen: Arm, Left; Blood   Result Value Ref Range    Glucose 158 (H) 65 - 99 mg/dL    BUN 11 8 - 23 mg/dL    Creatinine 0.63 0.57 - 1.00 mg/dL    Sodium 143 136 - 145 mmol/L    Potassium 3.4 (L) 3.5 - 5.2 mmol/L    Chloride 109 (H) 98 - 107 mmol/L    CO2 27.0 22.0 - 29.0 mmol/L    Calcium 8.8 8.6 - 10.5 mg/dL    Total Protein 7.0 6.0 - 8.5 g/dL    Albumin 4.0 3.5 - 5.2 g/dL    ALT (SGPT) 21 1 - 33 U/L    AST (SGOT) 20 1 - 32 U/L    Alkaline Phosphatase 130 (H) 39 - 117 U/L    Total Bilirubin 0.5 0.0 - 1.2 mg/dL    Globulin 3.0 gm/dL    A/G Ratio 1.3 g/dL    BUN/Creatinine Ratio 17.5 7.0 - 25.0    Anion Gap 7.0 5.0 - 15.0 mmol/L    eGFR 96.8 >60.0 mL/min/1.73   Lipase    Collection Time: 12/17/24  9:30 AM    Specimen: Arm, Left; Blood   Result Value Ref  Range    Lipase 20 13 - 60 U/L   Lactic Acid, Plasma    Collection Time: 12/17/24  9:30 AM    Specimen: Arm, Left; Blood   Result Value Ref Range    Lactate 2.0 0.5 - 2.0 mmol/L   Green Top (Gel)    Collection Time: 12/17/24  9:30 AM   Result Value Ref Range    Extra Tube Hold for add-ons.    Lavender Top    Collection Time: 12/17/24  9:30 AM   Result Value Ref Range    Extra Tube hold for add-on    Gold Top - SST    Collection Time: 12/17/24  9:30 AM   Result Value Ref Range    Extra Tube Hold for add-ons.    Light Blue Top    Collection Time: 12/17/24  9:30 AM   Result Value Ref Range    Extra Tube Hold for add-ons.    CBC Auto Differential    Collection Time: 12/17/24  9:30 AM    Specimen: Arm, Left; Blood   Result Value Ref Range    WBC 4.43 3.40 - 10.80 10*3/mm3    RBC 4.07 3.77 - 5.28 10*6/mm3    Hemoglobin 12.3 12.0 - 15.9 g/dL    Hematocrit 36.8 34.0 - 46.6 %    MCV 90.4 79.0 - 97.0 fL    MCH 30.2 26.6 - 33.0 pg    MCHC 33.4 31.5 - 35.7 g/dL    RDW 13.3 12.3 - 15.4 %    RDW-SD 43.9 37.0 - 54.0 fl    MPV 10.0 6.0 - 12.0 fL    Platelets 197 140 - 450 10*3/mm3    Neutrophil % 59.1 42.7 - 76.0 %    Lymphocyte % 32.3 19.6 - 45.3 %    Monocyte % 6.5 5.0 - 12.0 %    Eosinophil % 1.4 0.3 - 6.2 %    Basophil % 0.5 0.0 - 1.5 %    Immature Grans % 0.2 0.0 - 0.5 %    Neutrophils, Absolute 2.62 1.70 - 7.00 10*3/mm3    Lymphocytes, Absolute 1.43 0.70 - 3.10 10*3/mm3    Monocytes, Absolute 0.29 0.10 - 0.90 10*3/mm3    Eosinophils, Absolute 0.06 0.00 - 0.40 10*3/mm3    Basophils, Absolute 0.02 0.00 - 0.20 10*3/mm3    Immature Grans, Absolute 0.01 0.00 - 0.05 10*3/mm3    nRBC 0.0 0.0 - 0.2 /100 WBC   ECG 12 Lead Syncope    Collection Time: 12/17/24 11:19 AM   Result Value Ref Range    QT Interval 426 ms    QTC Interval 443 ms   Urinalysis With Microscopic If Indicated (No Culture) - Urine, Clean Catch    Collection Time: 12/17/24 12:32 PM    Specimen: Urine, Clean Catch   Result Value Ref Range    Color, UA Yellow Yellow,  Straw    Appearance, UA Clear Clear    pH, UA 8.5 (H) 5.0 - 8.0    Specific Gravity, UA 1.007 1.005 - 1.030    Glucose, UA Negative Negative    Ketones, UA Negative Negative    Bilirubin, UA Negative Negative    Blood, UA Negative Negative    Protein, UA Negative Negative    Leuk Esterase, UA Negative Negative    Nitrite, UA Negative Negative    Urobilinogen, UA 0.2 E.U./dL 0.2 - 1.0 E.U./dL   High Sensitivity Troponin T    Collection Time: 12/17/24  2:51 PM    Specimen: Arm, Right; Blood   Result Value Ref Range    HS Troponin T <6 <14 ng/L   High Sensitivity Troponin T 1Hr    Collection Time: 12/17/24  4:27 PM    Specimen: Blood   Result Value Ref Range    HS Troponin T <6 <14 ng/L    Troponin T Delta         Ordered the above labs and independently reviewed the results.        RADIOLOGY  CT Head Without Contrast    Result Date: 12/17/2024  EMERGENCY CT SCAN OF THE HEAD WITHOUT CONTRAST ON 12/17/2024  CLINICAL HISTORY: This is a 68-year-old female patient who has acute onset dizziness  TECHNIQUE: Spiral CT images were obtained from the base of the skull to the vertex without intravenous contrast. The images were reformatted and are submitted in 3 mm thick axial, sagittal and coronal CT sections with brain algorithm.  COMPARISON: There are no prior studies from [facility] for comparison..  FINDINGS: The brain parenchyma is normal in attenuation. The ventricles are normal in size. I see no focal mass effect and no midline shift. No extra-axial fluid collections are identified. There is no evidence of acute intracranial hemorrhage. The calvarium and skull base are normal in appearance. The orbits are normal in appearance. The paranasal sinuses and the mastoid air cells and the middle ear cavities are clear.      1. Normal head CT. The etiology of this patient's acute onset dizziness is not established on this exam. If neurologic symptoms warrant an MRI of the brain could be obtained for more  complete assessment.  Radiation dose reduction techniques were utilized, including automated exposure control and exposure modulation based on body size.        I ordered the above noted radiological studies. Reviewed by me and discussed with radiologist.  See dictation for official radiology interpretation.      MEDICATIONS GIVEN IN ER    Medications   sodium chloride 0.9 % flush 10 mL (has no administration in time range)   aspirin chewable tablet 324 mg (0 mg Oral Not Given 12/17/24 1842)   sodium chloride 0.9 % flush 10 mL (has no administration in time range)   sodium chloride 0.9 % flush 10 mL (has no administration in time range)   sodium chloride 0.9 % infusion 40 mL (has no administration in time range)   ondansetron ODT (ZOFRAN-ODT) disintegrating tablet 4 mg (has no administration in time range)     Or   ondansetron (ZOFRAN) injection 4 mg (has no administration in time range)   nitroglycerin (NITROSTAT) SL tablet 0.4 mg (has no administration in time range)   Potassium Replacement - Follow Nurse / BPA Driven Protocol (has no administration in time range)   Magnesium Standard Dose Replacement - Follow Nurse / BPA Driven Protocol (has no administration in time range)   Phosphorus Replacement - Follow Nurse / BPA Driven Protocol (has no administration in time range)   Calcium Replacement - Follow Nurse / BPA Driven Protocol (has no administration in time range)   acetaminophen (TYLENOL) tablet 650 mg (has no administration in time range)     Or   acetaminophen (TYLENOL) 160 MG/5ML oral solution 650 mg (has no administration in time range)     Or   acetaminophen (TYLENOL) suppository 650 mg (has no administration in time range)   sennosides-docusate (PERICOLACE) 8.6-50 MG per tablet 2 tablet (has no administration in time range)     And   polyethylene glycol (MIRALAX) packet 17 g (has no administration in time range)     And   bisacodyl (DULCOLAX) EC tablet 5 mg (has no administration in time range)     And    bisacodyl (DULCOLAX) suppository 10 mg (has no administration in time range)   sodium chloride 0.9 % bolus 1,000 mL (0 mL Intravenous Stopped 12/17/24 1232)   ondansetron (ZOFRAN) injection 4 mg (4 mg Intravenous Given 12/17/24 1405)   acetaminophen (TYLENOL) tablet 1,000 mg (1,000 mg Oral Given 12/17/24 1620)   potassium chloride (K-DUR,KLOR-CON) ER tablet 40 mEq (40 mEq Oral Given 12/17/24 1651)         ADDITIONAL ORDERS CONSIDERED BUT NOT ORDERED:  Nothing    PROGRESS, DATA ANALYSIS, CONSULTS, AND MEDICAL DECISION MAKING    All labs have been independently interpreted by myself.  All radiology studies have been independently interpreted by myself and discussed with radiologist dictating the report.   EKGs independently interpreted by myself.  Discussion below represents my analysis of pertinent findings related to patient's condition, differential diagnosis, treatment plan and final disposition.    I have discussed case with Dr. Mcneil, emergency room physician.  He has performed his own bedside examination and agrees with treatment plan.    ED Course as of 12/1955   Tue Dec 17, 2024   1040 Patient presents with a now improved episode of lightheadedness, vomiting, weakness.  Patient has a nonfocal neuroexam with stable vitals.  Differential diagnoses include but not limited to near syncope, electrolyte abnormality, anemia, cardiac arrhythmia. [EE]   1047 Creatinine: 0.63 [EE]   1047 Hemoglobin: 12.3 [EE]   1330 Lipase: 20 [MM]   1330 Lactate: 2.0 [MM]   1330 Potassium(!): 3.4  My own independent or potation of the EKG that was done at 11:19 AM reveals a rate of 65 it is sinus rhythm.  The NV interval looks a little short.  It is narrow complex normal axis I do not appreciate any acute injury pattern some nonspecific T wave changes in several leads  QT looks normal compared this EKG to an EKG on January 22, 2018 overall looks fairly similar. [MM]   1408 Patient was not orthostatic but was symptomatic  with unsteadiness when she stood up.  Did she [MM]   1609 I did discuss results of the CT scan of the head with radiologist Dr. rGimaldo.  No acute abnormality seen. [MM]   1609 HS Troponin T: <6 [MM]   1609 I have reevaluated the patient.  She states that has not had no recurrent chest pain.  Dizziness is better as she is sitting still lying in the bed.  She does state that she does have a headache.  She states this is typical of her past headaches.  It is not as severe migraine it is a mild headache.  Was not abrupt in onset.  This is again similar to headache she has had before.  I will Go[place] give her some Tylenol.  I did clarify with her about the results of the test plan for admission.  All questions answered at this time. [MM]   1617 Did discuss the case with Fabiola who is the midlevel provider in the observation unit.  She agrees to admit the patient to the hospital.  All questions answered. [MM]      ED Course User Index  [EE] Ayden Aguilar PA  [MM] Jaime Mcneil MD       AS OF 19:55 EST VITALS:    BP - 120/66  HR - 68  TEMP - 98.1 °F (36.7 °C) (Oral)  O2 SATS - 96%        DIAGNOSIS  Final diagnoses:   Near syncope   Chest pain, unspecified type   Benign paroxysmal positional vertigo, unspecified laterality         DISPOSITION  Admitted      Dictated utilizing Dragon dictation     Ayden Aguilar PA  12/1955

## 2024-12-17 NOTE — Clinical Note
[facility] EMERGENCY DEPARTMENT  [address] [facility] [address]  Phone: [phone]    Khai Traore accompanied Christina Traore to the emergency department on 12/17/2024. They may return to work on 12/18/2024.        Thank you for choosing [facility].    Jose L Santana MD

## 2024-12-17 NOTE — CONSULTS
Neurology Consult Note    Consult Date: 12/17/2024    Referring MD: No ref. provider found    Reason for Consult I have been asked to see the patient in neurological consultation to render advice and opinion regarding vertigo.    Christina Traore is a 68 y.o. female past medical history of anemia, anxiety, BPPV, depression, GERD, hyperlipidemia, low back strain, migraine, rheumatoid arthritis presents to the ED with vertigo.  Patient was at work today around 8 AM when suddenly she started to feel like the room was spinning and turned her head and then felt very lightheaded like she was going to pass out.  She yelled out for coworker to help her and was safely sat down in a chair.  She denied loss of consciousness but felt like she had to keep her head down.  She is admits to feeling a little nauseous at the time, short of breath, palpitations, generalized weakness/paresthesias and sense of impending doom.  She said it felt like it anxiety attack but different because she has never almost passed out before.   She said it took her about 15 minutes for the symptoms to pass then when she got up to stand she felt vertiginous and a little nauseous similar to how she is felt over the last 2 months with intermittent vertigo provoked by head movement.  States when she got nauseous she had some urinary incontinence.  She did not have chest pain before this. When she arrived in the emergency department or soon after she started getting some discomfort on the left side of her chest. That discomfort lasted she believes for an hour to 2 hours. That discomfort in her chest is gone.     She admits to getting headaches about once a week that are mild and global that she typically takes Excedrin for which helps alleviate the headaches.  Has history of migraines from age 15 to in her 40s prior to her having her hysterectomy.  She states she has not had severe headaches since then.  Denies bowel incontinence or tongue bite or  "confusion following.  She states she struggled with vertigo for several years and that PT has helped her in the past.  She states she has not had PT after her COVID diagnosis and just has been dealing with vertigo.  She tends to avoid medications for vertigo as they make her too drowsy outside of before bed.  She denies any visual deficits, focal weakness/numbness, speech deficits, gait imbalance, facial droop, tinnitus and hearing loss.  Patient admits to history of childhood epilepsy from age [age] was on Dilantin and phenobarbital for management.  Has not had seizures since.  Patient arrival 135/62 mmHg and pulse 67 bpm.  Blood thinners or anticoagulation prior to arrival.    Past Medical/Surgical Hx:  Past Medical History:   Diagnosis Date    Vertigo      Past Surgical History:   Procedure Laterality Date    BREAST SURGERY      FOOT SURGERY Right     HYSTERECTOMY         Medications On Admission  Medications Prior to Admission   Medication Sig Dispense Refill Last Dose/Taking    cyclobenzaprine (FLEXERIL) 10 MG tablet Take 1 tablet by mouth 3 (Three) Times a Day. 90 tablet 0     HYDROcodone-acetaminophen (NORCO) 5-325 MG per tablet Take 1 tablet by mouth Every 6 (Six) Hours As Needed for Moderate Pain . 20 tablet 0        Allergies:  No Known Allergies    Social Hx:  Social History     Socioeconomic History    Marital status: Single   Tobacco Use    Smoking status: Never    Smokeless tobacco: Never   Substance and Sexual Activity    Alcohol use: No    Drug use: No       Family Hx:  No family history on file.      Exam    /75 (BP Location: Left arm, Patient Position: Lying)   Pulse 64   Temp 97.5 °F (36.4 °C) (Oral)   Resp 18   Ht 172.7 cm (68\")   Wt 72.6 kg (160 lb)   SpO2 95%   BMI 24.33 kg/m²   gen: NAD, vitals reviewed  MS: oriented x3, recent/remote memory intact, normal attention/concentration, language intact, no neglect, normal fund of knowledge  CN: visual acuity grossly normal, visual fields " "full, PERRL, EOMI, facial sensation equal, no facial droop, hearing symmetric, palate elevates symmetrically, shoulder shrug equal, tongue midline, negative test of skew and head impulse test  Motor: 5/5 throughout upper and lower extremities, normal tone  Sensation: intact to cold temperature throughout  Coordination: no dysmetria with finger to nose bilaterally    DATA:    Lab Results   Component Value Date    GLUCOSE 158 (H) 12/17/2024    CALCIUM 8.8 12/17/2024     12/17/2024    K 3.4 (L) 12/17/2024    CO2 27.0 12/17/2024     (H) 12/17/2024    BUN 11 12/17/2024    CREATININE 0.63 12/17/2024    EGFRIFAFRI 95 06/12/2020    EGFRIFNONA 82 06/12/2020    BCR 17.5 12/17/2024    ANIONGAP 7.0 12/17/2024     Lab Results   Component Value Date    WBC 4.43 12/17/2024    HGB 12.3 12/17/2024    HCT 36.8 12/17/2024    MCV 90.4 12/17/2024     12/17/2024     Lab Results   Component Value Date    LDL 77.2 09/25/2024    LDL 86 01/17/2023    LDL 86 07/18/2022     No results found for: \"HGBA1C\"  No results found for: \"INR\", \"PROTIME\"    Lab review:   Glucose 150  Lactate 2.0  Lipase 20  WBC 4.43  Urinalysis pH 8.5, rest WNL    EKG:  Sinus rhythm  Short RI interval  Left atrial enlargement  Borderline  intraventricular conduction delay  RSR' in V1 or V2, right VCD or RVH    Imaging review:   CT head without contrast:  FINDINGS: The brain parenchyma is normal in attenuation. The ventricles  are normal in size. I see no focal mass effect and no midline shift. No  extra-axial fluid collections are identified. There is no evidence of  acute intracranial hemorrhage. The calvarium and skull base are normal  in appearance. The orbits are normal in appearance. The paranasal  sinuses and the mastoid air cells and the middle ear cavities are clear.     IMPRESSION:  1. Normal head CT. The etiology of this patient's acute onset dizziness  is not established on this exam. If neurologic symptoms warrant an MRI  of the brain could " be obtained for more complete assessment.    Diagnoses:  Presyncope  Vertigo  History of BPPV  History of migraine  Reported history of childhood epilepsy  Anxiety and depression    Comment: Patient send today seems like a presyncope spell likely provoked by anxiety or possible panic attack.  She did have chest pain on admission to the ED so primary care team following.  She also did have incontinence during this episode with a history of childhood epilepsy and no seizures since 15.      PLAN:   MRI brain without contrast pending...  Aspirin/statin  Vestibular PT if MRI negative  Symptomatic management of dizziness and nausea    Recommendations discussed with Dr. Stovall who is in agreement with plan.

## 2024-12-17 NOTE — ED PROVIDER NOTES
"I supervised care provided by the midlevel provider.   We have discussed this patient's history, physical exam, and treatment plan.  I have reviewed the note and personally saw and examined the patient and agree with the plan of care.   I have seen and evaluated this patient.  Talk with the patient's granddaughter was at bedside as well.  This patient was at work today about 8:00.  Suddenly she felt like she was arline pass out.  Had a warm sensation felt lightheaded she kind of went down but did not pass out completely she sat down coworkers helped her.  She did not injure herself.  She then soon after that started with what she states is \"vertigo\".  She has been suffering from vertigo for several months and has had this even longer than that in the past.  She says then she felt like the room was spinning and had nausea and vomited several times.  That vertigo is worse with movement.  This was different than what she felt like when she felt like she was going to pass out.  She did not have chest pain before this.  When she arrived in the emergency department or soon after she started getting some discomfort on the left side of her chest.  That discomfort lasted she believes for an hour to 2 hours.  That discomfort in her chest is gone.  Right now if she moves her head she has vertigo which is very similar to what she has been experiencing for months and episodically for years.  She does have chronic sensitivity to the light when her eyes are open.  That has not changed.  Denies any new visual change, speech change.  Denies any new numbness or tingling.  When she had the episode of almost passing out and the vertigo soon after she did have some tingling and numbness in her hands and toes that went up her arms and legs bilaterally.  This was transient and brief and has resolved completely.  No shortness of breath.  She has no history of heart or lung problems.  It was in the late 90s she did have a stress test in the " past.    GENERAL: Anxious female.  Not distressed  HENT: nares patent  Head/neck/ face are symmetric without gross deformity or swelling  EYES: no scleral icterus  CV: regular rhythm, regular rate with intact distal pulses  RESPIRATORY: normal effort and no respiratory distress  ABDOMEN: soft and nontender  MUSCULOSKELETAL: no deformity  NEURO: alert and appropriate, moves all extremities, follows commands. Recent and remote memory functions are normal. The patient is attentive with normal concentration. Language is fluent. Speech is clear. The speech is non-dysarthric. Fund of knowledge is normal.   Symmetric smile with no facial droop.  Eyes close shut strongly bilaterally.  Symmetric eyebrow raise bilaterally.  EOMI, PERRL  CN II-XII grossly normal otherwise.  5/5 strength to extremities.No sensory changes to face or extremities. No focal weakness.  No pronator drift.  Intact FNF.  She was able to get up with assistance to use the bedside commode.  No meningismus.    NIH stroke scale is 0 when I do move her head or sit her up she starts to feel as if the room is spinning and does have what she describes her typical vertigo.  This resolves quickly when she is at rest.  SKIN: warm, dry    Vital signs and nursing notes reviewed.    Plan   ED Course as of 12/17/24 1907 Tue Dec 17, 2024   1040 Patient presents with a now improved episode of lightheadedness, vomiting, weakness.  Patient has a nonfocal neuroexam with stable vitals.  Differential diagnoses include but not limited to near syncope, electrolyte abnormality, anemia, cardiac arrhythmia. [EE]   1047 Creatinine: 0.63 [EE]   1047 Hemoglobin: 12.3 [EE]   1330 Lipase: 20 [MM]   1330 Lactate: 2.0 [MM]   1330 Potassium(!): 3.4  My own independent or potation of the EKG that was done at 11:19 AM reveals a rate of 65 it is sinus rhythm.  The DE interval looks a little short.  It is narrow complex normal axis I do not appreciate any acute injury pattern some  nonspecific T wave changes in several leads  QT looks normal compared this EKG to an EKG on January 22, 2018 overall looks fairly similar. [MM]   1408 Patient was not orthostatic but was symptomatic with unsteadiness when she stood up.  Did she [MM]   1609 I did discuss results of the CT scan of the head with radiologist Dr. Grimaldo.  No acute abnormality seen. [MM]   1609 HS Troponin T: <6 [MM]   1609 I have reevaluated the patient.  She states that has not had no recurrent chest pain.  Dizziness is better as she is sitting still lying in the bed.  She does state that she does have a headache.  She states this is typical of her past headaches.  It is not as severe migraine it is a mild headache.  Was not abrupt in onset.  This is again similar to headache she has had before.  I will [place] give her some Tylenol.  I did clarify with her about the results of the test plan for admission.  All questions answered at this time. [MM]   1617 Did discuss the case with Fabiola who is the midlevel provider in the observation unit.  She agrees to admit the patient to the hospital.  All questions answered. [MM]      ED Course User Index  [EE] Ayden Aguilar, PA  [MM] Jaime Mcneil MD         Will arline check a troponin on this patient.  We can check a CT of her head and also can give her some Zofran.  Has not had chest discomfort or near syncope before.  Plan will be to ultimately admit her to the observation unit.  Wait for the initial troponin to come back.  All questions answered at this time.         Jaime Mcneil MD  12/17/24 3388

## 2024-12-18 ENCOUNTER — APPOINTMENT (OUTPATIENT)
Dept: CARDIOLOGY | Facility: HOSPITAL | Age: 69
End: 2024-12-18
Payer: COMMERCIAL

## 2024-12-18 VITALS
WEIGHT: 168 LBS | OXYGEN SATURATION: 99 % | HEIGHT: 68 IN | BODY MASS INDEX: 25.46 KG/M2 | DIASTOLIC BLOOD PRESSURE: 74 MMHG | SYSTOLIC BLOOD PRESSURE: 134 MMHG | TEMPERATURE: 97.9 F | HEART RATE: 57 BPM | RESPIRATION RATE: 17 BRPM

## 2024-12-18 PROBLEM — R42 VERTIGO: Status: RESOLVED | Noted: 2024-12-17 | Resolved: 2024-12-18

## 2024-12-18 PROBLEM — M25.50 MULTIPLE JOINT PAIN: Status: ACTIVE | Noted: 2020-12-18

## 2024-12-18 PROBLEM — M54.50 LOW BACK PAIN: Status: ACTIVE | Noted: 2020-06-02

## 2024-12-18 PROBLEM — R56.9 SEIZURE: Status: ACTIVE | Noted: 2023-08-14

## 2024-12-18 PROBLEM — F32.A DEPRESSIVE DISORDER: Status: ACTIVE | Noted: 2023-08-14

## 2024-12-18 PROBLEM — H81.10 BENIGN PAROXYSMAL POSITIONAL VERTIGO: Status: ACTIVE | Noted: 2017-08-04

## 2024-12-18 PROBLEM — R55 SYNCOPE AND COLLAPSE: Status: ACTIVE | Noted: 2023-09-13

## 2024-12-18 LAB
ALBUMIN SERPL-MCNC: 3.7 G/DL (ref 3.5–5.2)
ALBUMIN/GLOB SERPL: 1.3 G/DL
ALP SERPL-CCNC: 110 U/L (ref 39–117)
ALT SERPL W P-5'-P-CCNC: 16 U/L (ref 1–33)
ANION GAP SERPL CALCULATED.3IONS-SCNC: 9 MMOL/L (ref 5–15)
AORTIC DIMENSIONLESS INDEX: 0.68 (DI)
AST SERPL-CCNC: 18 U/L (ref 1–32)
AV MEAN PRESS GRAD SYS DOP V1V2: 3.5 MMHG
AV VMAX SYS DOP: 147.7 CM/SEC
BASOPHILS # BLD AUTO: 0.03 10*3/MM3 (ref 0–0.2)
BASOPHILS NFR BLD AUTO: 0.6 % (ref 0–1.5)
BH CV ECHO MEAS - ACS: 1.74 CM
BH CV ECHO MEAS - AO MAX PG: 8.7 MMHG
BH CV ECHO MEAS - AO ROOT DIAM: 2.8 CM
BH CV ECHO MEAS - AO V2 VTI: 30.6 CM
BH CV ECHO MEAS - AVA(I,D): 2.06 CM2
BH CV ECHO MEAS - EDV(CUBED): 64.4 ML
BH CV ECHO MEAS - EDV(MOD-SP2): 76 ML
BH CV ECHO MEAS - EDV(MOD-SP4): 61 ML
BH CV ECHO MEAS - EF(MOD-SP2): 64.5 %
BH CV ECHO MEAS - EF(MOD-SP4): 67.2 %
BH CV ECHO MEAS - ESV(CUBED): 24.6 ML
BH CV ECHO MEAS - ESV(MOD-SP2): 27 ML
BH CV ECHO MEAS - ESV(MOD-SP4): 20 ML
BH CV ECHO MEAS - FS: 27.4 %
BH CV ECHO MEAS - IVS/LVPW: 0.92 CM
BH CV ECHO MEAS - IVSD: 1.02 CM
BH CV ECHO MEAS - LAT PEAK E' VEL: 8.5 CM/SEC
BH CV ECHO MEAS - LV DIASTOLIC VOL/BSA (35-75): 32.1 CM2
BH CV ECHO MEAS - LV MASS(C)D: 139.2 GRAMS
BH CV ECHO MEAS - LV MAX PG: 3 MMHG
BH CV ECHO MEAS - LV MEAN PG: 1.45 MMHG
BH CV ECHO MEAS - LV SYSTOLIC VOL/BSA (12-30): 10.5 CM2
BH CV ECHO MEAS - LV V1 MAX: 87 CM/SEC
BH CV ECHO MEAS - LV V1 VTI: 21 CM
BH CV ECHO MEAS - LVIDD: 4 CM
BH CV ECHO MEAS - LVIDS: 2.9 CM
BH CV ECHO MEAS - LVOT AREA: 3 CM2
BH CV ECHO MEAS - LVOT DIAM: 1.96 CM
BH CV ECHO MEAS - LVPWD: 1.11 CM
BH CV ECHO MEAS - MED PEAK E' VEL: 6.3 CM/SEC
BH CV ECHO MEAS - MV A DUR: 0.12 SEC
BH CV ECHO MEAS - MV A MAX VEL: 81.5 CM/SEC
BH CV ECHO MEAS - MV DEC SLOPE: 519.9 CM/SEC2
BH CV ECHO MEAS - MV DEC TIME: 0.24 SEC
BH CV ECHO MEAS - MV E MAX VEL: 84.8 CM/SEC
BH CV ECHO MEAS - MV E/A: 1.04
BH CV ECHO MEAS - MV MAX PG: 4.6 MMHG
BH CV ECHO MEAS - MV MEAN PG: 1.24 MMHG
BH CV ECHO MEAS - MV P1/2T: 54.4 MSEC
BH CV ECHO MEAS - MV V2 VTI: 31.5 CM
BH CV ECHO MEAS - MVA(P1/2T): 4 CM2
BH CV ECHO MEAS - MVA(VTI): 1.99 CM2
BH CV ECHO MEAS - PA ACC TIME: 0.19 SEC
BH CV ECHO MEAS - PA V2 MAX: 75.3 CM/SEC
BH CV ECHO MEAS - PULM A REVS DUR: 0.14 SEC
BH CV ECHO MEAS - PULM A REVS VEL: 21.9 CM/SEC
BH CV ECHO MEAS - PULM DIAS VEL: 34.5 CM/SEC
BH CV ECHO MEAS - PULM S/D: 1.52
BH CV ECHO MEAS - PULM SYS VEL: 52.5 CM/SEC
BH CV ECHO MEAS - RAP SYSTOLE: 3 MMHG
BH CV ECHO MEAS - RV MAX PG: 1.93 MMHG
BH CV ECHO MEAS - RV V1 MAX: 69.4 CM/SEC
BH CV ECHO MEAS - RV V1 VTI: 19.4 CM
BH CV ECHO MEAS - RVSP: 17 MMHG
BH CV ECHO MEAS - SV(LVOT): 62.9 ML
BH CV ECHO MEAS - SV(MOD-SP2): 49 ML
BH CV ECHO MEAS - SV(MOD-SP4): 41 ML
BH CV ECHO MEAS - SVI(LVOT): 33.1 ML/M2
BH CV ECHO MEAS - SVI(MOD-SP2): 25.8 ML/M2
BH CV ECHO MEAS - SVI(MOD-SP4): 21.6 ML/M2
BH CV ECHO MEAS - TAPSE (>1.6): 2.8 CM
BH CV ECHO MEAS - TR MAX PG: 14 MMHG
BH CV ECHO MEAS - TR MAX VEL: 186.8 CM/SEC
BH CV ECHO MEASUREMENTS AVERAGE E/E' RATIO: 11.46
BH CV XLRA - TDI S': 10.7 CM/SEC
BILIRUB SERPL-MCNC: 0.6 MG/DL (ref 0–1.2)
BUN SERPL-MCNC: 9 MG/DL (ref 8–23)
BUN/CREAT SERPL: 12.9 (ref 7–25)
CALCIUM SPEC-SCNC: 8.6 MG/DL (ref 8.6–10.5)
CHLORIDE SERPL-SCNC: 111 MMOL/L (ref 98–107)
CHOLEST SERPL-MCNC: 157 MG/DL (ref 0–200)
CO2 SERPL-SCNC: 23 MMOL/L (ref 22–29)
CREAT SERPL-MCNC: 0.7 MG/DL (ref 0.57–1)
DEPRECATED RDW RBC AUTO: 44.6 FL (ref 37–54)
EGFRCR SERPLBLD CKD-EPI 2021: 94.3 ML/MIN/1.73
EOSINOPHIL # BLD AUTO: 0.1 10*3/MM3 (ref 0–0.4)
EOSINOPHIL NFR BLD AUTO: 2 % (ref 0.3–6.2)
ERYTHROCYTE [DISTWIDTH] IN BLOOD BY AUTOMATED COUNT: 13.1 % (ref 12.3–15.4)
GLOBULIN UR ELPH-MCNC: 2.8 GM/DL
GLUCOSE SERPL-MCNC: 86 MG/DL (ref 65–99)
HBA1C MFR BLD: 5.7 % (ref 4.8–5.6)
HCT VFR BLD AUTO: 36.4 % (ref 34–46.6)
HDLC SERPL-MCNC: 59 MG/DL (ref 40–60)
HGB BLD-MCNC: 11.8 G/DL (ref 12–15.9)
IMM GRANULOCYTES # BLD AUTO: 0.01 10*3/MM3 (ref 0–0.05)
IMM GRANULOCYTES NFR BLD AUTO: 0.2 % (ref 0–0.5)
LDLC SERPL CALC-MCNC: 84 MG/DL (ref 0–100)
LDLC/HDLC SERPL: 1.42 {RATIO}
LV EF BIPLANE MOD: 66.5 %
LYMPHOCYTES # BLD AUTO: 1.54 10*3/MM3 (ref 0.7–3.1)
LYMPHOCYTES NFR BLD AUTO: 30.5 % (ref 19.6–45.3)
MCH RBC QN AUTO: 29.6 PG (ref 26.6–33)
MCHC RBC AUTO-ENTMCNC: 32.4 G/DL (ref 31.5–35.7)
MCV RBC AUTO: 91.2 FL (ref 79–97)
MONOCYTES # BLD AUTO: 0.33 10*3/MM3 (ref 0.1–0.9)
MONOCYTES NFR BLD AUTO: 6.5 % (ref 5–12)
NEUTROPHILS NFR BLD AUTO: 3.04 10*3/MM3 (ref 1.7–7)
NEUTROPHILS NFR BLD AUTO: 60.2 % (ref 42.7–76)
NRBC BLD AUTO-RTO: 0 /100 WBC (ref 0–0.2)
PLATELET # BLD AUTO: 203 10*3/MM3 (ref 140–450)
PMV BLD AUTO: 10.4 FL (ref 6–12)
POTASSIUM SERPL-SCNC: 4 MMOL/L (ref 3.5–5.2)
PROT SERPL-MCNC: 6.5 G/DL (ref 6–8.5)
QT INTERVAL: 445 MS
QTC INTERVAL: 422 MS
RBC # BLD AUTO: 3.99 10*6/MM3 (ref 3.77–5.28)
SINUS: 2.7 CM
SODIUM SERPL-SCNC: 143 MMOL/L (ref 136–145)
STJ: 2.36 CM
TRIGL SERPL-MCNC: 71 MG/DL (ref 0–150)
TROPONIN T SERPL HS-MCNC: 6 NG/L
VIT B12 BLD-MCNC: 517 PG/ML (ref 211–946)
VLDLC SERPL-MCNC: 14 MG/DL (ref 5–40)
WBC NRBC COR # BLD AUTO: 5.05 10*3/MM3 (ref 3.4–10.8)

## 2024-12-18 PROCEDURE — G0378 HOSPITAL OBSERVATION PER HR: HCPCS

## 2024-12-18 PROCEDURE — 83036 HEMOGLOBIN GLYCOSYLATED A1C: CPT | Performed by: PHYSICIAN ASSISTANT

## 2024-12-18 PROCEDURE — 93306 TTE W/DOPPLER COMPLETE: CPT | Performed by: INTERNAL MEDICINE

## 2024-12-18 PROCEDURE — 82607 VITAMIN B-12: CPT | Performed by: PHYSICIAN ASSISTANT

## 2024-12-18 PROCEDURE — 93010 ELECTROCARDIOGRAM REPORT: CPT | Performed by: INTERNAL MEDICINE

## 2024-12-18 PROCEDURE — 99203 OFFICE O/P NEW LOW 30 MIN: CPT | Performed by: INTERNAL MEDICINE

## 2024-12-18 PROCEDURE — 93005 ELECTROCARDIOGRAM TRACING: CPT | Performed by: PHYSICIAN ASSISTANT

## 2024-12-18 PROCEDURE — 84484 ASSAY OF TROPONIN QUANT: CPT | Performed by: PHYSICIAN ASSISTANT

## 2024-12-18 PROCEDURE — 85025 COMPLETE CBC W/AUTO DIFF WBC: CPT | Performed by: PHYSICIAN ASSISTANT

## 2024-12-18 PROCEDURE — 80053 COMPREHEN METABOLIC PANEL: CPT | Performed by: PHYSICIAN ASSISTANT

## 2024-12-18 PROCEDURE — 95992 CANALITH REPOSITIONING PROC: CPT

## 2024-12-18 PROCEDURE — 93306 TTE W/DOPPLER COMPLETE: CPT

## 2024-12-18 PROCEDURE — 97162 PT EVAL MOD COMPLEX 30 MIN: CPT

## 2024-12-18 PROCEDURE — 80061 LIPID PANEL: CPT | Performed by: PHYSICIAN ASSISTANT

## 2024-12-18 PROCEDURE — 97110 THERAPEUTIC EXERCISES: CPT

## 2024-12-18 RX ORDER — MECLIZINE HYDROCHLORIDE 25 MG/1
12.5 TABLET ORAL 3 TIMES DAILY PRN
Qty: 20 TABLET | Refills: 0 | Status: SHIPPED | OUTPATIENT
Start: 2024-12-18

## 2024-12-18 RX ORDER — MECLIZINE HYDROCHLORIDE 25 MG/1
25 TABLET ORAL 3 TIMES DAILY PRN
Status: DISCONTINUED | OUTPATIENT
Start: 2024-12-18 | End: 2024-12-18 | Stop reason: HOSPADM

## 2024-12-18 RX ADMIN — ACETAMINOPHEN 650 MG: 325 TABLET ORAL at 13:21

## 2024-12-18 NOTE — PROGRESS NOTES
ED OBSERVATION PROGRESS/DISCHARGE SUMMARY    Date of Admission: 12/17/2024   LOS: 0 days   PCP: Estefany Dominique MD        Subjective     Hospital Outcome:   68-year-old female seen examined at bedside following admission to observation secondary to vertigo.     ED workup reviewed, initial troponin less than 6, repeat troponin less than 6. Potassium of 3.4 otherwise largely unremarkable CBC and CMP for acute findings. Lipase normal, lactic of 2.0. UA negative. CT head shows no acute findings. EKG shows sinus rhythm 65 bpm, no ST elevation apparent. Patient is afebrile, pulse in the 60s, on room air oxygen and 96% SpO2 on blood pressure normotensive. Patient given Tylenol, Zofran, 1 L normal saline bolus and potassium replacement in ED.     Neurology evaluated patient and has diagnosed with vertigo and presyncope.  MRI brain shows no evidence of acute intracranial pathology.  Echocardiogram shows LVEF 66-70% with normal systolic and diastolic function.  Cardiology evaluated patient this morning and has since signed off without any further workup.  Neurology reevaluated patient and has cleared her for discharge.        ROS:  General: no fevers, chills  Respiratory: no cough, dyspnea  Cardiovascular: no chest pain, palpitations  Abdomen: No abdominal pain, nausea, vomiting, or diarrhea  Neurologic: No focal weakness    Objective   Physical Exam:  I have reviewed the vital signs.  Temp:  [97.4 °F (36.3 °C)-98.6 °F (37 °C)] 97.4 °F (36.3 °C)  Heart Rate:  [55-77] 55  Resp:  [16-18] 16  BP: (120-142)/(62-90) 125/76  General Appearance:    Alert, cooperative, no distress  Head:    Normocephalic, atraumatic  Eyes:    Sclerae anicteric  Neck:   Supple, no mass  Lungs: Clear to auscultation bilaterally, respirations unlabored  Heart: Regular rate and rhythm, S1 and S2 normal, no murmur, rub or gallop  Abdomen:  Soft, nontender, bowel sounds active, nondistended  Extremities: No clubbing, cyanosis, or edema to lower  extremities  Pulses:  2+ and symmetric in distal lower extremities  Skin: No rashes   Neurologic: Oriented x3, Normal strength to extremities    Results Review:    I have reviewed the labs, radiology results and diagnostic studies.    Results from last 7 days   Lab Units 12/18/24  0406   WBC 10*3/mm3 5.05   HEMOGLOBIN g/dL 11.8*   HEMATOCRIT % 36.4   PLATELETS 10*3/mm3 203     Results from last 7 days   Lab Units 12/18/24  0406 12/17/24  0930   SODIUM mmol/L 143 143   POTASSIUM mmol/L 4.0 3.4*   CHLORIDE mmol/L 111* 109*   CO2 mmol/L 23.0 27.0   BUN mg/dL 9 11   CREATININE mg/dL 0.70 0.63   CALCIUM mg/dL 8.6 8.8   BILIRUBIN mg/dL 0.6 0.5   ALK PHOS U/L 110 130*   ALT (SGPT) U/L 16 21   AST (SGOT) U/L 18 20   GLUCOSE mg/dL 86 158*     Imaging Results (Last 24 Hours)       Procedure Component Value Units Date/Time    CT Head Without Contrast [[id number]] Collected: 12/17/24 1622     Updated: 12/18/24 0456    Narrative:      EMERGENCY CT SCAN OF THE HEAD WITHOUT CONTRAST ON 12/17/2024     CLINICAL HISTORY: This is a 68-year-old female patient who has acute  onset dizziness     TECHNIQUE: Spiral CT images were obtained from the base of the skull to  the vertex without intravenous contrast. The images were reformatted and  are submitted in 3 mm thick axial, sagittal and coronal CT sections with  brain algorithm.     COMPARISON: There are no prior studies from [facility] for comparison..     FINDINGS: The brain parenchyma is normal in attenuation. The ventricles  are normal in size. I see no focal mass effect and no midline shift. No  extra-axial fluid collections are identified. There is no evidence of  acute intracranial hemorrhage. The calvarium and skull base are normal  in appearance. The orbits are normal in appearance. The paranasal  sinuses and the mastoid air cells and the middle ear cavities are clear.       Impression:      1. Normal head CT. The etiology of this patient's acute onset  dizziness  is not established on this exam. If neurologic symptoms warrant, an MRI  of the brain could be obtained for a more complete assessment.     Radiation dose reduction techniques were utilized, including automated  exposure control and exposure modulation based on body size.        This report was finalized on 12/18/2024 4:53 AM by Dr. Dayne Grimaldo M.D  on Workstation: [id number]       MRI Brain Without Contrast [[id number]] Collected: 12/17/24 2222     Updated: 12/17/24 2222    Narrative:        Patient: MAISHA RAMOS  Time Out: 22:21  Exam(s): MRI HEAD Without Contrast     EXAM:    MR Head Without Intravenous Contrast    CLINICAL HISTORY:     Reason for exam: vertigo.    TECHNIQUE:    Magnetic resonance images of the head brain without intravenous   contrast in multiple planes.    COMPARISON:  Prior head CT from December 17, 2024.    FINDINGS: The study is limited secondary to motion artifact.    Brain: Minimal nonspecific white matter changes.  The flow voids at the   base of the brain are intact..  No mass.  No hemorrhage.  No acute   infarct.    Ventricles:  Unremarkable.  No ventriculomegaly.    Bones joints:  Unremarkable.  No acute fracture.    Sinuses: Chronic ethmoid sinusitis..  No acute sinusitis.    Mastoid air cells:  Unremarkable as visualized.  No mastoid effusion.    Orbits:  Unremarkable as visualized.    IMPRESSION:       No evidence of acute intracranial pathology.      Impression:          Electronically signed by Elizabeth Ramirez MD on 12-17-24 at 2221            I have reviewed the medications.     Discharge Medications        ASK your doctor about these medications        Instructions Start Date   atorvastatin 10 MG tablet  Commonly known as: LIPITOR   10 mg, Daily      busPIRone 15 MG tablet  Commonly known as: BUSPAR   15 mg, 3 Times Daily      cyclobenzaprine 10 MG tablet  Commonly known as: FLEXERIL   10 mg, Oral, 3 Times Daily      escitalopram 5 MG tablet  Commonly known  as: LEXAPRO   5 mg, Daily      HYDROcodone-acetaminophen 5-325 MG per tablet  Commonly known as: NORCO   1 tablet, Oral, Every 6 Hours PRN              ---------------------------------------------------------------------------------------------  Assessment & Plan   Assessment/Problem List    Vertigo      Plan:    Vertigo/lightheadedness  Hx of vertigo/Meniere's   -initial troponin less than 6, repeat troponin less than 6.    - Lipase normal, lactic of 2.0.   - UA negative.    -CT head shows no acute findings.    -EKG shows sinus rhythm 65 bpm, no ST elevation apparent.    -Patient is afebrile, pulse in the 60s, on room air oxygen and 96% SpO2 on blood pressure normotensive.    -Patient given Tylenol, Zofran, 1 L normal saline bolus and potassium replacement in ED.  -Cardiology consult  -Neurology consult  -Check MRI brain without contrast, echo  -Check troponin, EKG, orthostatics, TSH, magnesium, A1c, lipid panel, B12, RVP  -PT consult  -Neurochecks, continuous cardiac monitoring  -Heart healthy diet     Hypokalemia  -Potassium of 3.4 otherwise largely unremarkable CBC and CMP for acute findings.  Replacement protocol ordered     Hyperlipidemia  -Check lipid panel, continue home statin     Anxiety/depression  -Continue home BuSpar, Lexapro       Disposition: Home    Follow-up after Discharge: PCP    This note will serve as a discharge summary    Ang Dai, NAYA 12/18/24 08:22 EST    I have worn appropriate PPE during this patient encounter, sanitized my hands both with entering and exiting patient's room.      32 minutes has been spent by [facility] providers in the care of this patient while under observation status

## 2024-12-18 NOTE — PROGRESS NOTES
Patient Care Team:  Estefany Dominique MD as PCP - General (Family Medicine)     JULIA STEWARD H&P Note    I supervised care provided by the midlevel provider. We have discussed this patient's history, physical exam, and treatment plan. I have reviewed the midlevel provider's note and I agree with the midlevel provider's findings and plan of care.   SHARED VISIT: This visit was performed by BOTH a physician and an APC. The substantive portion of the medical decision making was performed by this attesting physician who made or approved the management plan and takes responsibility for patient management.   I have personally had a face to face encounter with the patient.   My personal findings are documented below:    History:  68-year-old female presents with dizziness which she describes as both a lightheaded sensation as well as room spinning, also had some associated chest discomfort now resolved.  Patient has history of vertigo in the past.  No double vision, no facial droop, no difficulty speech, no weakness or numbness.    Physical Exam:  General: No acute distress.  HENT: NCAT, PERRL, Nares patent.  Eyes: no scleral icterus.  Neck: trachea midline, no ROM limitations.  CV: regular rhythm, regular rate.  Respiratory: normal effort, CTAB.  Abdomen: soft, nondistended, NTTP, no rebound tenderness, no guarding or rigidity.  Musculoskeletal: no deformity.  Neuro: alert, moves all extremities, follows commands.  Skin: warm, dry.    Assessment and Plan:  Patient admitted to observation unit, cardiology and neurology consulted, obtaining echocardiogram, obtaining MRI, monitoring.

## 2024-12-18 NOTE — PLAN OF CARE
Goal Outcome Evaluation:      Pt d/c'd per provider order. Pt is A&Ox4, VSS, respirations regular, and in NAD upon discharge. Pt educated on medication administration, potential side-effects, and provided with information regarding follow-up care. Pt verbalized understanding. Pt w/ no further questions at this time. All belongings sent with patient.

## 2024-12-18 NOTE — PLAN OF CARE
Goal Outcome Evaluation:         Pt to obs for vertigo, n/v, chest discomfort and headache; all sx resolved upon assessment by this RN. Pt is resting in bed w/ VSS, in NAD, respirations regular, A&Ox4, denies any sx at this time. Neurology and PT consulted.

## 2024-12-18 NOTE — DISCHARGE INSTRUCTIONS
Return to the emergency department for symptoms recurrence or exacerbation  Follow-up with outpatient physical therapy  Take meclizine as needed

## 2024-12-18 NOTE — THERAPY EVALUATION
"Acute Care - Physical Therapy Initial Evaluation  [facility]     Patient Name: Christina Traore  : 1955  MRN: [id number]  Today's Date: 2024   Onset of Illness/Injury or Date of Surgery: 24  Visit Dx:     ICD-10-CM ICD-9-CM   1. Near syncope  R55 780.2   2. Chest pain, unspecified type  R07.9 786.50   3. Benign paroxysmal positional vertigo, unspecified laterality  H81.10 386.11     Patient Active Problem List   Diagnosis    Vertigo    Seizure    Syncope and collapse    Rheumatoid factor positive    Poor short-term memory    Multiple joint pain    Migratory polyarthritis    Meniere's disease    Low back pain    Hyperlipidemia    Esophageal reflux    Mixed anxiety and depressive disorder    Depressive disorder    Anxiety and depression    Benign paroxysmal positional vertigo    Osteoarthritis    Anterior knee pain    Allergic rhinitis     Past Medical History:   Diagnosis Date    Benign paroxysmal positional vertigo 2017    Meniere's disease 2014    Migratory polyarthritis 2016    Mixed anxiety and depressive disorder 2014    Vertigo      Past Surgical History:   Procedure Laterality Date    BREAST SURGERY      FOOT SURGERY Right     HYSTERECTOMY       PT Assessment (Last 12 Hours)       PT Evaluation and Treatment       Row Name 24 1200          Physical Therapy Time and Intention    Subjective Information complains of  \"feeling of balance\"  -     Document Type evaluation  -     Mode of Treatment individual therapy;physical therapy  -     Patient Effort good  -     Symptoms Noted During/After Treatment none  -       Row Name 24 1200          General Information    Patient Profile Reviewed yes  -     Onset of Illness/Injury or Date of Surgery 24  -     Patient Observations alert;cooperative;agree to therapy  -     Prior Level of Function independent:  -     Pertinent History of Current Functional Problem n/v, dizziness  -     " Existing Precautions/Restrictions no known precautions/restrictions  -     Barriers to Rehab medically complex  -[facility] Name 12/18/24 1200          Living Environment    Current Living Arrangements home  -[facility] Name 12/18/24 1200          Home Use of Assistive/Adaptive Equipment    Equipment Currently Used at Home none  -[facility] Name 12/18/24 1200          Pain    Pretreatment Pain Rating 0/10 - no pain  -     Posttreatment Pain Rating 0/10 - no pain  -[facility]       Row Name 12/18/24 1200          Cognition    Affect/Mental Status (Cognition) WFL  -LH       Row Name 12/18/24 1200          Range of Motion Comprehensive    General Range of Motion bilateral lower extremity ROM WFL  -LH       Row Name 12/18/24 1200          Strength Comprehensive (MMT)    General Manual Muscle Testing (MMT) Assessment no strength deficits identified  -LH       Row Name 12/18/24 1200          Bed Mobility    Bed Mobility sit-supine;supine-sit  -     Supine-Sit [place] (Bed Mobility) independent  -     Sit-Supine [place] (Bed Mobility) independent  -LH       Row Name 12/18/24 1200          Transfers    Transfers stand-sit transfer;sit-stand transfer  -LH       Row Name 12/18/24 1200          Sit-Stand Transfer    Sit-Stand [place] (Transfers) independent  -LH       Row Name 12/18/24 1200          Stand-Sit Transfer    Stand-Sit [place] (Transfers) independent  -LH       Row Name 12/18/24 1200          Gait/Stairs (Locomotion)    [place] Level (Gait) contact guard  -     Distance in Feet (Gait) 100  -     Pattern (Gait) step-through  -     Deviations/Abnormal Patterns (Gait) akil decreased  -     Comment, (Gait/Stairs) HHA intermittently due to feeling off balance  -LH       Row Name 12/18/24 1200          Balance    Balance Assessment sitting static balance;standing static balance  -     Static Sitting Balance independent  -     Position, Sitting Balance unsupported;sitting edge  of bed  -     Static Standing Balance standby assist  -       Row Name 12/18/24 1200          Plan of Care Review    Plan of Care Reviewed With patient  -     Outcome Evaluation Pt 69 yo female presented to ED w vertigo. Pt w complex PMH including RA, seizures, menieres, BPPV, migranes, auditority sensitivity. Pt reports yesterday she was standing at work and room started spinning felt very lightheaded like she was going to pass out and nausea. Pt reports she has had ENT and OP PT in the past for vertigo. Pt also self reports she is under MD direction weaning from lexapro since september and had a similar episode in the past when she attempted weaning. Pt also reports recent strep infection in November. On PT exam pt denies spinning or nausea however reports imbalance. Reports L ear fullness. Chronic tinnitus and hearing loss noted. Pt screened for BPPV, asyptomatic L [place] Hallpike. R [place] hallpike was reproducing veritigo symptoms however no nystagmus observed, transitioned into EPley manuever 1 cycle good tolerance. Pt also has slowed visual tracking noted on exam R>L. Dizziness episode likely multifactorial. Pt may benefit from skilled PT acutely, anticipate OP PT at DC - handout provided.  -       Row Name 12/18/24 1200          Positioning and Restraints    Pre-Treatment Position in bed  -     Post Treatment Position bed  -     In Bed fowlers;call light within reach;encouraged to call for assist;exit alarm on  -       Row Name 12/18/24 1200          Therapy Assessment/Plan (PT)    Criteria for Skilled Interventions Met (PT) yes  -     Therapy Frequency (PT) 5 times/wk  -       Row Name 12/18/24 1200          PT Evaluation Complexity    History, PT Evaluation Complexity 1-2 personal factors and/or comorbidities  -     Examination of Body Systems (PT Eval Complexity) total of 3 or more elements  -     Clinical Presentation (PT Evaluation Complexity) evolving  -     Clinical Decision Making  (PT Evaluation Complexity) moderate complexity  -     Overall Complexity (PT Evaluation Complexity) moderate complexity  -       Row Name 12/18/24 1200          Physical Therapy Goals    Gait Training Goal Selection (PT) gait training, PT goal 1  -       Row Name 12/18/24 1200          Gait Training Goal 1 (PT)    Activity/Assistive Device (Gait Training Goal 1, PT) gait (walking locomotion)  -     [place] Level (Gait Training Goal 1, PT) independent  -     Distance (Gait Training Goal 1, PT) 300  -     Time Frame (Gait Training Goal 1, PT) 1 week  -               User Key  (r) = Recorded By, (t) = Taken By, (c) = Cosigned By      Initials Name Provider Type     Marlyn Pat, PT Physical Therapist                    Physical Therapy Education       Title: PT OT SLP Therapies (Done)       Topic: Physical Therapy (Done)       Point: Mobility training (Done)       Learning Progress Summary            Patient Acceptance, H, VU,DU,NR by  at 12/18/2024 1302    Comment: vestibular PT, post manuever instruction, BPPV                      Point: Home exercise program (Done)       Learning Progress Summary            Patient Acceptance, H, VU,DU,NR by  at 12/18/2024 1302    Comment: vestibular PT, post manuever instruction, BPPV                      Point: Body mechanics (Done)       Learning Progress Summary            Patient Acceptance, H, VU,DU,NR by  at 12/18/2024 1302    Comment: vestibular PT, post manuever instruction, BPPV                      Point: Precautions (Done)       Learning Progress Summary            Patient Acceptance, H, VU,DU,NR by  at 12/18/2024 1302    Comment: vestibular PT, post manuever instruction, BPPV                                      User Key       Initials Effective Dates Name Provider Type [facility] 06/16/21 -  Marlyn Pat, PT Physical Therapist PT                  PT Recommendation and Plan  Anticipated Discharge Disposition (PT): home with  outpatient therapy services  Planned Therapy Interventions (PT): balance training, gait training, bed mobility training, home exercise program, ROM (range of motion), stair training, strengthening, stretching, transfer training  Therapy Frequency (PT): 5 times/wk  Plan of Care Reviewed With: patient  Outcome Evaluation: Pt 69 yo female presented to ED w vertigo. Pt w complex PMH including RA, seizures, menieres, BPPV, migranes, auditority sensitivity. Pt reports yesterday she was standing at work and room started spinning felt very lightheaded like she was going to pass out and nausea. Pt reports she has had ENT and OP PT in the past for vertigo. Pt also self reports she is under MD direction weaning from lexapro since september and had a similar episode in the past when she attempted weaning. Pt also reports recent strep infection in November. On PT exam pt denies spinning or nausea however reports imbalance. Reports L ear fullness. Chronic tinnitus and hearing loss noted. Pt screened for BPPV, asyptomatic L [place] Hallpike. R [place] hallpike was reproducing veritigo symptoms however no nystagmus observed, transitioned into EPley manuever 1 cycle good tolerance. Pt also has slowed visual tracking noted on exam R>L. Dizziness episode likely multifactorial. Pt may benefit from skilled PT acutely, anticipate OP PT at DC - handout provided.   Outcome Measures       Row Name 12/18/24 1300             How much help from another person do you currently need...    Turning from your back to your side while in flat bed without using bedrails? 4  -LH      Moving from lying on back to sitting on the side of a flat bed without bedrails? 4  -LH      Moving to and from a bed to a chair (including a wheelchair)? 4  -LH      Standing up from a chair using your arms (e.g., wheelchair, bedside chair)? 4  -LH      Climbing 3-5 steps with a railing? 3  -LH      To walk in hospital room? 3  -LH      AM-PAC 6 Clicks Score (PT) 22  -LH          Functional Assessment    Outcome Measure Options AM-PAC 6 Clicks Basic Mobility (PT)  -                User Key  (r) = Recorded By, (t) = Taken By, (c) = Cosigned By      Initials Name Provider Type     Marlyn Pat, PT Physical Therapist                     Time Calculation:    PT Charges       Row Name 12/18/24 1305             Time Calculation    Start Time 0900  -      Stop Time 0943  -      Time Calculation (min) 43 min  -      PT Received On 12/18/24  -      PT - Next Appointment 12/19/24  -      PT Goal Re-Cert Due Date 12/25/24  -         Time Calculation- PT    Total Timed Code Minutes- PT 30 minute(s)  -                User Key  (r) = Recorded By, (t) = Taken By, (c) = Cosigned By      Initials Name Provider Type     Marlyn Pat, PT Physical Therapist                  Therapy Charges for Today       Code Description Service Date Service Provider Modifiers Qty    39887607231  PT EVAL MOD COMPLEXITY 3 12/18/2024 Marlyn Pat, PT GP 1    [id number]  PT CANALITH REPOSITIONING PER DAY 12/18/2024 Marlyn Pat, PT GP 1    [id number]  PT THER PROC EA 15 MIN 12/18/2024 Marlyn Pat, PT GP 1            PT G-Codes  Outcome Measure Options: AM-PAC 6 Clicks Basic Mobility (PT)  AM-PAC 6 Clicks Score (PT): 22    Marlyn Pat, TAMI  12/18/2024

## 2024-12-18 NOTE — H&P
[facility]   HISTORY AND PHYSICAL    Patient Name: Christina Traore  : 1955  MRN: [id number]  Primary Care Physician:  Estefany Dominique MD  Date of admission: 2024    Subjective   Subjective     Chief Complaint:   Chief Complaint   Patient presents with    Vomiting         HPI:    Christina Traore is a 68 y.o. female who comes in complaining of multiple complaints.  Patient states she woke up this morning feeling lightheaded.  Patient states that the lightheadedness feeling made her feel like she was going to pass out.  Patient states that this got worse throughout the day while she was at work in which a coworker had to help her to keep her from falling over because of her severe lightheadedness.  Patient states that this continued to get worse to the point where they triggered her vertigo which she has a history of and led to her vomiting.  Patient denies any recent illness, fever, chills, diarrhea, chest pain or abdominal pain.    In the ED, initial troponin less than 6, repeat troponin less than 6.  Potassium of 3.4 otherwise largely unremarkable CBC and CMP for acute findings.  Lipase normal, lactic of 2.0.  UA negative.  CT head shows no acute findings.  EKG shows sinus rhythm 65 bpm, no ST elevation apparent.  Patient is afebrile, pulse in the 60s, on room air oxygen and 96% SpO2 on blood pressure normotensive.  Patient given Tylenol, Zofran, 1 L normal saline bolus and potassium replacement in ED.      Review of Systems   All systems were reviewed and negative except for: As per HPI    Personal History     Past Medical History:   Diagnosis Date    Benign paroxysmal positional vertigo 2017    Meniere's disease 2014    Migratory polyarthritis 2016    Mixed anxiety and depressive disorder 2014    Vertigo        Past Surgical History:   Procedure Laterality Date    BREAST SURGERY      FOOT SURGERY Right     HYSTERECTOMY         Family History: family history is not on  file. Otherwise pertinent FHx was reviewed and not pertinent to current issue.    Social History:  reports that she has never smoked. She has never been exposed to tobacco smoke. She has never used smokeless tobacco. She reports that she does not drink alcohol and does not use drugs.    Home Medications:  HYDROcodone-acetaminophen, atorvastatin, busPIRone, cyclobenzaprine, and escitalopram    Allergies:  No Known Allergies    Objective   Objective     Vitals:   Temp:  [97.5 °F (36.4 °C)-98.6 °F (37 °C)] 97.9 °F (36.6 °C)  Heart Rate:  [57-77] 61  Resp:  [16-18] 16  BP: (120-142)/(62-90) 122/78  Physical Exam    Constitutional: Awake, alert   Eyes: PERRLA, sclerae anicteric, no conjunctival injection   HENT: NCAT, mucous membranes moist   Neck: Supple, no thyromegaly, no lymphadenopathy, trachea midline   Respiratory: Clear to auscultation bilaterally, nonlabored respirations    Cardiovascular: RRR, no murmurs, rubs, or gallops, palpable pedal pulses bilaterally   Gastrointestinal: Positive bowel sounds, soft, nontender, nondistended   Musculoskeletal: No bilateral ankle edema, no clubbing or cyanosis to extremities   Psychiatric: Appropriate affect, cooperative   Neurologic: Oriented x 3, strength symmetric in all extremities, Cranial Nerves grossly intact to confrontation, speech clear   Skin: No rashes     Result Review    Result Review:  I have personally reviewed the results from the time of this admission to 12/18/2024 04:11 EST and agree with these findings:  [x]  Laboratory list / accordion  []  Microbiology  [x]  Radiology  [x]  EKG/Telemetry   []  Cardiology/Vascular   []  Pathology  []  Old records  []  Other:  Most notable findings include: see above      Assessment & Plan   Assessment / Plan     Brief Patient Summary:  Christina Traore is a 68 y.o. female who comes in complaining of multiple complaints including lightheadedness and vertigo.    Active Hospital Problems:  Active Hospital Problems     Diagnosis     **Vertigo      Plan:     Vertigo/lightheadedness  Hx of vertigo/Meniere's   -initial troponin less than 6, repeat troponin less than 6.    - Lipase normal, lactic of 2.0.   - UA negative.    -CT head shows no acute findings.    -EKG shows sinus rhythm 65 bpm, no ST elevation apparent.    -Patient is afebrile, pulse in the 60s, on room air oxygen and 96% SpO2 on blood pressure normotensive.    -Patient given Tylenol, Zofran, 1 L normal saline bolus and potassium replacement in ED.  -Cardiology consult  -Neurology consult  -Check MRI brain without contrast, echo  -Check troponin, EKG, orthostatics, TSH, magnesium, A1c, lipid panel, B12, RVP  -PT consult  -Neurochecks, continuous cardiac monitoring  -Heart healthy diet    Hypokalemia  -Potassium of 3.4 otherwise largely unremarkable CBC and CMP for acute findings.  Replacement protocol ordered    Hyperlipidemia  -Check lipid panel, continue home statin    Anxiety/depression  -Continue home BuSpar, Lexapro        VTE Prophylaxis:  Mechanical VTE prophylaxis orders are present.        CODE STATUS:    Level Of Support Discussed With: Patient  Code Status (Patient has no pulse and is not breathing): CPR (Attempt to Resuscitate)  Medical Interventions (Patient has pulse or is breathing): Full Support    Admission Status:  I believe this patient meets observation status.    78 minutes have been spent by [facility] providers in the care of this patient while under observation status.      Appropriate PPE worn during patient encounter.  Hand hygeine performed before and after seeing the patient.      Electronically signed by MAXIMILIAN Glover, 12/17/24, 9:19 PM EST.

## 2024-12-18 NOTE — PLAN OF CARE
Goal Outcome Evaluation:  Plan of Care Reviewed With: patient           Outcome Evaluation: Pt 69 yo female presented to ED w vertigo. Pt w complex PMH including RA, seizures, menieres, BPPV, migranes, auditority sensitivity. Pt reports yesterday she was standing at work and room started spinning felt very lightheaded like she was going to pass out and nausea. Pt reports she has had ENT and OP PT in the past for vertigo. Pt also self reports she is under MD direction weaning from lexapro since september and had a similar episode in the past when she attempted weaning. Pt also reports recent strep infection in November. On PT exam pt denies spinning or nausea however reports imbalance. Reports L ear fullness. Chronic tinnitus and hearing loss noted. Pt screened for BPPV, asyptomatic L [place] Hallpike. R [place] hallpike was reproducing veritigo symptoms however no nystagmus observed, transitioned into EPley manuever 1 cycle good tolerance. Pt also has slowed visual tracking noted on exam R>L. Dizziness episode likely multifactorial. Pt may benefit from skilled PT acutely, anticipate OP PT at DC - handout provided.Pt ambulated 100ft CGA/HHA does report feeling off balance. Encouraged rwx as needed for stability.     Anticipated Discharge Disposition (PT): home with outpatient therapy services

## 2024-12-18 NOTE — CONSULTS
Date of Hospital Visit: 24  Encounter Provider: Ana Shin MD  Place of Service: [facility] CARDIOLOGY  Patient Name: Christina Traore  :1955  Referral Provider: No ref. provider found    Chief complaint: Near syncope, vomiting    History of Present Illness:    Christina Traore is a 68 y.o. female     Yesterday, she was at work, standing at a counter.  She had no chest pain, difficulty breathing, heart racing or skipping, headache, vision changes, fevers, chills, sinus drainage.  She began feeling weak in her arms and legs.  She then thought she was get a pass out, some coworkers got her in her chair and wheeled her back over to her workstation.  When she got over there, she began having significant vertigo with vomiting.  EMS was called.  She has a history of vertigo and this felt just like this.    On arrival here, vitals were stable, orthostatics are negative.  Labs showed negative troponin x 2, unremarkable CMP and CBC except for low potassium 3.4.  MRI brain shows no acute intracranial pathology, CT head was normal.  Echocardiogram was performed this morning and is pending.  ECG shows sinus rhythm with no acute ST changes.    We have been asked to see her for dizziness.    Her son lives with her intermittently, otherwise she lives alone.  She does not smoke or use alcohol.    ECHO 2023  Summary:  Normal LV systolic function   Ejection fraction 64%   Trace mitral and tricuspid regurgitation   Compared to previous from --no significant change     Past Medical History:   Diagnosis Date    Benign paroxysmal positional vertigo 2017    Meniere's disease 2014    Migratory polyarthritis 2016    Mixed anxiety and depressive disorder 2014    Vertigo        Past Surgical History:   Procedure Laterality Date    BREAST SURGERY      FOOT SURGERY Right     HYSTERECTOMY         Medications Prior to Admission   Medication Sig Dispense  Refill Last Dose/Taking    atorvastatin (LIPITOR) 10 MG tablet Take 1 tablet by mouth Daily.   12/16/2024 Evening    busPIRone (BUSPAR) 15 MG tablet Take 1 tablet by mouth 3 (Three) Times a Day.   12/17/2024 Morning    escitalopram (LEXAPRO) 5 MG tablet Take 1 tablet by mouth Daily.   12/16/2024 Evening    cyclobenzaprine (FLEXERIL) 10 MG tablet Take 1 tablet by mouth 3 (Three) Times a Day. 90 tablet 0     HYDROcodone-acetaminophen (NORCO) 5-325 MG per tablet Take 1 tablet by mouth Every 6 (Six) Hours As Needed for Moderate Pain . 20 tablet 0        Current Meds  Scheduled Meds:aspirin, 324 mg, Oral, Once  atorvastatin, 10 mg, Oral, Daily  busPIRone, 15 mg, Oral, TID  escitalopram, 5 mg, Oral, Daily  sodium chloride, 10 mL, Intravenous, Q12H      Continuous Infusions:   PRN Meds:.  acetaminophen **OR** acetaminophen **OR** acetaminophen    senna-docusate sodium **AND** polyethylene glycol **AND** bisacodyl **AND** bisacodyl    Calcium Replacement - Follow Nurse / BPA Driven Protocol    Magnesium Standard Dose Replacement - Follow Nurse / BPA Driven Protocol    nitroglycerin    ondansetron ODT **OR** ondansetron    Phosphorus Replacement - Follow Nurse / BPA Driven Protocol    Potassium Replacement - Follow Nurse / BPA Driven Protocol    sodium chloride    sodium chloride    sodium chloride    Allergies as of 12/17/2024    (No Known Allergies)       Social History     Socioeconomic History    Marital status: Single   Tobacco Use    Smoking status: Never     Passive exposure: Never    Smokeless tobacco: Never   Vaping Use    Vaping status: Never Used   Substance and Sexual Activity    Alcohol use: No    Drug use: No    Sexual activity: Defer       History reviewed. No pertinent family history.    REVIEW OF SYSTEMS:   12 point ROS was performed and is negative except as outlined in HPI       Objective:   Temp:  [97.4 °F (36.3 °C)-98.6 °F (37 °C)] 97.4 °F (36.3 °C)  Heart Rate:  [55-77] 55  Resp:  [16-18] 16  BP:  "(120-142)/(62-90) 125/76  Body mass index is 25.54 kg/m².  Flowsheet Rows      Flowsheet Row First Filed Value   Admission Height 172.7 cm (68\") Documented at 12/17/2024 0920   Admission Weight 72.6 kg (160 lb) Documented at 12/17/2024 0920          Vitals:    12/18/24 0720   BP: 125/76   Pulse: 55   Resp: 16   Temp: 97.4 °F (36.3 °C)   SpO2: 95%       General Appearance:    Alert, cooperative, in no acute distress   Head:    Normocephalic, without obvious abnormality, atraumatic   Throat:   oral mucosa moist   Lungs:     Clear to auscultation,respirations regular, even and unlabored    Heart:    Regular rhythm and normal rate, normal S1 and S2, no murmur, no gallop, no rub, no click   Extremities:   Moves all extremities well, no edema, no cyanosis, no redness   Pulses:   Pulses palpable and equal bilaterally. Normal radial, carotid,  dorsalis pedis and posterior tibial pulses bilaterally.      Lab Review:      Results from last 7 days   Lab Units 12/18/24  0406   SODIUM mmol/L 143   POTASSIUM mmol/L 4.0   CHLORIDE mmol/L 111*   CO2 mmol/L 23.0   BUN mg/dL 9   CREATININE mg/dL 0.70   CALCIUM mg/dL 8.6   BILIRUBIN mg/dL 0.6   ALK PHOS U/L 110   ALT (SGPT) U/L 16   AST (SGOT) U/L 18   GLUCOSE mg/dL 86     Results from last 7 days   Lab Units 12/18/24  0406 12/17/24  1627 12/17/24  1451   HSTROP T ng/L 6 <6 <6     @LABRCNT(bnp)@  Results from last 7 days   Lab Units 12/18/24  0406 12/17/24  0930   WBC 10*3/mm3 5.05 4.43   HEMOGLOBIN g/dL 11.8* 12.3   HEMATOCRIT % 36.4 36.8   PLATELETS 10*3/mm3 203 197             Lipid Panel          9/25/2024    09:00 12/18/2024    04:06   Lipid Panel   Total Cholesterol  157    Total Cholesterol 150        Triglycerides  71    HDL Cholesterol 61     59    VLDL Cholesterol  14    LDL Cholesterol  77.2     84    LDL/HDL Ratio  1.42       Details          This result is from an external source.                 I personally viewed and interpreted the patient's EKG/Telemetry " data              Vertigo      Assessment and Plan:    Acute vertigo.  Cardiac workup is normal, no other workup needed.  If echo is normal, will sign off.  Does not need a cardiac monitor.  She had a normal echocardiogram 9/2023.      Addendum:  Echo 12/18/24  Interpretation Summary         Left ventricular systolic function is normal. Left ventricular ejection fraction appears to be 66 - 70%.    Left ventricular diastolic function was normal.    Estimated right ventricular systolic pressure from tricuspid regurgitation is normal (<35 mmHg).       Ana Shin MD  12/18/24  07:24 EST.  Time spent in reviewing chart, discussion and examination:

## 2024-12-18 NOTE — PROGRESS NOTES
MD ATTESTATION NOTE - Observation progress    The ALIN and I have discussed this patient's history, physical exam, and treatment plan.  I have reviewed the documentation and personally had a face to face interaction with the patient. I affirm the documentation and agree with the treatment and plan.  The attached note describes my personal findings.        SHARED APC FACE TO FACE: I performed a substantive part of the MDM during the patient's E/M visit. I personally evaluated and examined the patient. I personally made or approved the documented management plan and acknowledge its risk of complications.      Brief HPI: Patient states she had good night.  Patient states she has had vertigo in the past and feels similar to this.  Patient has had MRI that is negative.  Patient has been seen by cardiology and felt to be noncardiac in etiology.  Echo this morning unremarkable.  States she has been able to ambulate with some unsteadiness            GENERAL: no acute distress  HENT: nares patent  EYES: no scleral icterus  CV: regular rhythm, normal rate  RESPIRATORY: normal effort  ABDOMEN: soft  MUSCULOSKELETAL: no deformity  NEURO: alert, moves all extremities, follows commands  PSYCH:  calm, cooperative  SKIN: warm, dry    Vital signs and nursing notes reviewed.        Plan: Vestibular PT. neurology

## 2024-12-18 NOTE — PLAN OF CARE
Problem: Adult Inpatient Plan of Care  Goal: Plan of Care Review  Outcome: Progressing  Flowsheets (Taken 12/17/2024 2351)  Progress: no change  Outcome Evaluation: Pt admitted with vertigo. Negative troponins. CT head negative. MRI negative. Orthos BPs negative. Assist x1. Vestibular PT  Plan of Care Reviewed With: patient  Goal: Patient-Specific Goal (Individualized)  Outcome: Progressing  Goal: Absence of Hospital-Acquired Illness or Injury  Outcome: Progressing  Intervention: Identify and Manage Fall Risk  Recent Flowsheet Documentation  Taken 12/17/2024 2200 by Ag Frank RN  Safety Promotion/Fall Prevention:   activity supervised   clutter free environment maintained   fall prevention program maintained   nonskid shoes/slippers when out of bed   safety round/check completed   room organization consistent  Taken 12/17/2024 2000 by Ag Frank RN  Safety Promotion/Fall Prevention: activity supervised  Intervention: Prevent Skin Injury  Recent Flowsheet Documentation  Taken 12/17/2024 2000 by Ag Frank RN  Body Position: position changed independently  Goal: Optimal Comfort and Wellbeing  Outcome: Progressing  Intervention: Provide Person-Centered Care  Recent Flowsheet Documentation  Taken 12/17/2024 2000 by Ag Frank RN  Trust Relationship/Rapport:   care explained   choices provided  Goal: Readiness for Transition of Care  Outcome: Progressing     Problem: Nausea and Vomiting  Goal: Nausea and Vomiting Relief  Outcome: Progressing     Problem: Syncope  Goal: Absence of Syncopal Symptoms  Outcome: Progressing  Intervention: Manage Effect of Syncopal Symptoms  Recent Flowsheet Documentation  Taken 12/17/2024 2200 by Ag Frank RN  Safety Promotion/Fall Prevention:   activity supervised   clutter free environment maintained   fall prevention program maintained   nonskid shoes/slippers when out of bed   safety round/check completed   room organization consistent  Taken 12/17/2024 2000 by Ag Frank  RN  Safety Promotion/Fall Prevention: activity supervised   Goal Outcome Evaluation:  Plan of Care Reviewed With: patient        Progress: no change  Outcome Evaluation: Pt admitted with vertigo. Negative troponins. CT head negative. MRI negative. Orthos BPs negative. Assist x1. Vestibular PT

## 2024-12-18 NOTE — CASE MANAGEMENT/SOCIAL WORK
Discharge Planning Assessment  [facility]     Patient Name: Christina Traore  MRN: [id number]  Today's Date: 12/18/2024    Admit Date: 12/17/2024    Plan: Home with possible outpt PT/ vestibular rehab here at [facility]   Discharge Needs Assessment       Row Name 12/18/24 1251       Living Environment    People in Home alone    Current Living Arrangements home    Potentially Unsafe Housing Conditions none    In the past 12 months has the electric, gas, oil, or water company threatened to shut off services in your home? No    Primary Care Provided by self    Provides Primary Care For no one    Family Caregiver if Needed child(ngozi), adult;grandchild(ngozi), adult    Quality of Family Relationships helpful;involved;supportive    Able to Return to Prior Arrangements yes       Resource/Environmental Concerns    Resource/Environmental Concerns none    Transportation Concerns none       Transportation Needs    In the past 12 months, has lack of transportation kept you from medical appointments or from getting medications? no    In the past 12 months, has lack of transportation kept you from meetings, work, or from getting things needed for daily living? No       Food Insecurity    Within the past 12 months, you worried that your food would run out before you got the money to buy more. Never true    Within the past 12 months, the food you bought just didn't last and you didn't have money to get more. Never true       Transition Planning    Patient/Family Anticipates Transition to home    Patient/Family Anticipated Services at Transition none    Transportation Anticipated family or friend will provide;car, drives self       Discharge Needs Assessment    Readmission Within the Last 30 Days no previous admission in last 30 days    Equipment Currently Used at Home none    Concerns to be Addressed denies needs/concerns at this time    Do you want help finding or keeping work or a job? I do not need or want help    Do you want  help with school or training? For example, starting or completing job training or getting a high school diploma, GED or equivalent No    Anticipated Changes Related to Illness none    Equipment Needed After Discharge none    Outpatient/Agency/Support Group Needs outpatient therapy    Provided Post Acute Provider List? N/A    Provided Post Acute Provider Quality & Resource List? N/A    Current Discharge Risk lives alone                   Discharge Plan       Row Name 12/18/24 0294       Plan    Plan Home with possible outpt PT/ vestibular rehab here at [facility].    Plan Comments S/W pt at bedside. Facesheet info confirmed.  Pt lives alone in a house, is IADLS at baseline and can drive.  She does not use any AD for mobility, but has a walker if needed. She works full time. Pt's son visits her daily to check on her.  He and pt's granddtr can assist pt as needed.  No hx of HH or SNF.  Pt plans to return home upon DC.  She said that PT recommended outpt vestibular rehab, and pt would like to come here to [facility] for that.  Her family will provide transport home upon DC.  ...........Jayla DANIEL/ BE                  Continued Care and Services - Admitted Since 12/17/2024    No active coordination exists for this encounter.          Demographic Summary       Row Name 12/18/24 1250       General Information    Admission Type observation    Arrived From home    Required Notices Provided Observation Status Notice    Referral Source admission list    Reason for Consult discharge planning    Preferred Language English                   Functional Status       Row Name 12/18/24 1250       Functional Status    Usual Activity Tolerance good    Current Activity Tolerance moderate       Functional Status, IADL    Medications independent    Meal Preparation independent    Housekeeping independent    Laundry independent    Shopping independent    If for any reason you need help with day-to-day activities such as bathing, preparing meals,  shopping, managing finances, etc., do you get the help you need? I get all the help I need       Mental Status    General Appearance WDL WDL       Mental Status Summary    Recent Changes in Mental Status/Cognitive Functioning no changes       Employment/    Employment Status employed full-time                               Jayla Flanagan RN

## 2024-12-19 NOTE — CASE MANAGEMENT/SOCIAL WORK
Case Management Discharge Note      Final Note: DC'd home. Nico ROBLES RN    Provided Post Acute Provider List?: N/A  Provided Post Acute Provider Quality & Resource List?: N/A    Selected Continued Care - Discharged on 12/18/2024 Admission date: 12/17/2024 - Discharge disposition: Home or Self Care      Destination    No services have been selected for the patient.                Durable Medical Equipment    No services have been selected for the patient.                Dialysis/Infusion    No services have been selected for the patient.                Home Medical Care    No services have been selected for the patient.                Therapy    No services have been selected for the patient.                Community Resources    No services have been selected for the patient.                Community & DME    No services have been selected for the patient.                    Transportation Services  Private: Car    Final Discharge Disposition Code: 01 - home or self-care

## 2025-01-15 ENCOUNTER — TREATMENT (OUTPATIENT)
Dept: PHYSICAL THERAPY | Facility: CLINIC | Age: 70
End: 2025-01-15
Payer: COMMERCIAL

## 2025-01-15 DIAGNOSIS — R42 VERTIGO: Primary | ICD-10-CM

## 2025-01-15 DIAGNOSIS — H81.09 MENIERE'S DISEASE, UNSPECIFIED LATERALITY: ICD-10-CM

## 2025-01-15 NOTE — PROGRESS NOTES
Physical Therapy Initial Evaluation and Plan of Care  [facility]  [address], [address]  [place] [address]    Patient: Christina Traore   : 1955  Diagnosis/ICD-10 Code:  Vertigo [R42]  Referring practitioner: NAYA Shen  Today's Date: 1/15/2025    Subjective       Objective       Assessment & Plan       Assessment  Assessment details: Pt feeling very poor today. Sensitive to light, sound, and anything more than very slow movement. History of recurrent BPPV and Meniere's disease with drop attacks and vomiting on a couple of occasions last month. She is having a flare-up of symptoms at the moment. Pt  requested to defer treatment today due to possible nausea and vomiting and needing to go back to work. Pt was advised this is fine, and meanwhile we will try to arrange referral to a vestibular specialist.        Timed Codes:  Manual Therapy -      0     mins  39891  Therapeutic Exercise: -     0     mins  71076     Neuromuscular Stefanie -     0     mins  79847   Therapeutic Activity -      0     mins  45891     Ultrasound -                     _0_   mins  02040  Iontophoresis -                   0     mins  57699    Timed Treatment:      0   mins    Untimed Codes:  Evaluation -          0   mins  Electrical Stimulation -        0   mins  [id number] ([id number])  Traction -                             0   mins  49356    Total Treatment:         0   mins    PT: Thai Moreno PT     [place] License Number:  [id number]  Electronically signed by Thai Moreno PT, 01/15/25, 2:30 PM EST    Certification Period: 2025 thru 4/15/2025  I certify that the therapy services are furnished while this patient is under my care.  The services outlined above are required by this patient, and will be reviewed every 90 days.         Physician Signature:__________________________________________________    PHYSICIAN: Ang Dai APRN      DATE:     Please sign and return via  fax to .[email or web address] . Thank you, [facility].    PT SIGNATURE: Thai Moreno, PT   [place] LICENSE: [id number]

## 2025-01-24 ENCOUNTER — TREATMENT (OUTPATIENT)
Dept: PHYSICAL THERAPY | Facility: CLINIC | Age: 70
End: 2025-01-24
Payer: COMMERCIAL

## 2025-01-24 DIAGNOSIS — R26.89 BALANCE PROBLEM: ICD-10-CM

## 2025-01-24 DIAGNOSIS — M53.9 CERVICAL DYSFUNCTION: ICD-10-CM

## 2025-01-24 DIAGNOSIS — H81.09 MENIERE'S DISEASE, UNSPECIFIED LATERALITY: ICD-10-CM

## 2025-01-24 DIAGNOSIS — Z74.09 IMPAIRED FUNCTIONAL MOBILITY, BALANCE, GAIT, AND ENDURANCE: ICD-10-CM

## 2025-01-24 DIAGNOSIS — R42 DIZZINESS: Primary | ICD-10-CM

## 2025-01-24 NOTE — PROGRESS NOTES
"Physical Therapy Initial Evaluation and Plan of Care  [facility] Physical Therapy   [facility], [address], [place] [address]    Patient: Christina Traore   : 1955  Referring practitioner: Ang Dai APRN  Date of Initial Visit: 2025  Today's Date: 2025  Patient seen for 1 sessions  PT Clinic location: [facility], [address] [place] [address] [address], [place] [address]          Visit Diagnoses:    ICD-10-CM ICD-9-CM   1. Dizziness  R42 780.4   2. Balance problem  R26.89 781.99   3. Cervical dysfunction  M53.9 723.9   4. Meniere's disease, unspecified laterality  H81.09 386.00   5. Impaired functional mobility, balance, gait, and endurance  Z74.09 V49.89       Subjective Questionnaire: DHI: 80%    Subjective Evaluation    History of Present Illness  Mechanism of injury: Pt reports that she has a 25+ year history of Meniere's disease, no medications. She reported dizziness and a ringing/tone in her left ear.    In  she was on her computer and suddenly jerked her head when she saw a fly move past her face, and she felt very dizzy and like her eyes were twitching.  She notes that she had a concussion in  after falling on ice.  She has had several episodes that were really bad like this, taken to hospital, didn't find anything specific.    2024 she went to hospital after she was dizzy at work ended up nauseated and threw up, then coworkers called 911. CT/MRI taken without acute pathology noted. Referred to vestibular therapy.    She had COVID around a year ago, and since then she has felt more sensitive to light, sound, movement etc.    She feels like she's drifting sideways when she stands, and when she has an episode (once since Dec 17th) she feels like \"all the air has been sucked out of her body and she's going to pass out\". She does note a sensation of spinning, often lasting up to 15 minutes where she feels like she has to lay " "down.  She reports that movement is provoked with driving when there is movement of cars nearby in her visual field. She reports bending forward or looking upward both aggravate her symptoms.    She notes that she moves slowly in her bed, but hasn't noticed dizziness particularly during rolling or supine to sit motions.    She reports previous history of migraines from 5 years old until hysterectomy in her 40s, at which time they stopped. She sometimes gets headaches but they are eased with excedrin.     She notes that an eye doctor has previously told her that \"her eyes don't move the way they should\" but that doctor said that having it looked at might make her migraines worse so she did not seek follow-up.    She reports seasonal allergies, most of the year at this point. Had been taking Claritin, but stopped due to how dry it makes her feel. She reports taking a Mucinex last night due to how stopped up her left ear felt, and she thinks that it helped her symptoms some.    She reports that she drinks \"a lot\" of water a day, 4-5x 16oz bottles.    Works as  in a front office setting, clerical work. She notes not a lot of people around her at all times.    She reports walking some for exercise, some light upper body weights 2-3x weekly.    She notes some twitching of left eyelid with occasional drooping of the eyelid. Denies double vision, no facial paresthesia, no speech/swallowing difficulty.      Medical history: Meniere's disease, positive rheumatoid factor, anxiety (lexapro, buspar), hysterectomy 2003, tube in ear by ENT 7-8 years ago without improvement. See chart for further detail.   Therapy Precautions: N/A        Objective          Active Range of Motion     Additional Active Range of Motion Details  Cervical  Flex - 100% dizziness on return to neutral  Ext - 40% lightheadedness severe, unwilling to go further  L SB - 100% slight unsteadiness on return to neutral  R SB - 100%   L rot - 100% " dizziness on return to neutral  R rot - 100%    Passive Range of Motion     Additional Passive Range of Motion Details  Cervical extension - 100% with external fixation, no dizziness noted    Ambulation     Observational Gait     Additional Observational Gait Details  Pt has staggering gait pattern with frequent need for guarding or use of wall/furniture support for safety. She demonstrated more difficulty in complex environment and with auditory distraction.    Functional Assessment     Comments  Visual Fields - WNL  Smooth pursuit - minor losses of fixation with mild dizziness symptoms horizontally, no fixation difficulty vertical but provocation of dizziness particularly with moving eyes upward  Saccadic motion - slight undershoot frequently with horizontal saccades, with reports of unsteadiness after ~6-8 repetitions    VOR (head shake) - WNL, dizziness upon cessation  VOR cancellation - severe dizziness, difficulty with focus and reports of blurring of target    Cervical Dizziness Differentiation Test - immediate onset dizziness  External cervical fixation - resolution of baseline dizziness immediately upon fixation    Balance  Feet together - significant trunk sway and frequent UE support required  Single leg balance - unable either leg            Assessment & Plan       Assessment  Impairments: abnormal coordination, abnormal gait, activity intolerance, impaired balance, lacks appropriate home exercise program and safety issue   Functional limitations: walking, sitting, standing and unable to perform repetitive tasks   Assessment details: The patient is a 69 y.o. female who presents to physical therapy today for complaints of worsening dizziness. Upon initial evaluation, the patient demonstrates the following impairments: decreased control of cervical ROM with provocation of dizziness particularly with extension, gaze tracking and fixation deficits, and positive special testing indicative of cervicogenic  dizziness with vestibular and oculomotor deficits.     Due to these impairments, the patient is unable to perform or has difficulty with the following functional tasks: sitting, standing, walking, driving, and using a computer. The patient would benefit from skilled PT services to address functional limitations and impairments and to improve patient quality of life.        Prognosis: good    Goals  Plan Goals: SHORT TERM GOALS: Time for Goal Achievement Options: 4 weeks    1.  Patient to be compliant with HEP and tolerate only minimal side effects.  2.  Pt able to demonstrate the ability to sit to stand without dizziness or light headedness to improve safety with transfers.  3.  Pt able to demonstrate a good ability to perform static balance in standing within clinic gym environment to improve safety within basic work environment.      LONG TERM GOALS: Time for Goal Achievement Options: 2 months  1.  Pt to report minimal to no vertigo symptoms with all activities.  2.  Pt able to ambulate across clinic with head rotation and no dizziness, nausea or other side effects for improving ambulation in community and house.  3.  Patient to score < 30% on Dizziness Handicap Inventory demonstrating a decreased perception of handicap.    Plan  Therapy options: will be seen for skilled therapy services  Planned modality interventions: cryotherapy, dry needling, high voltage pulsed current (pain management), TENS, microcurrent electrical stimulation, low level laser therapy, iontophoresis, thermotherapy (hydrocollator packs), traction and ultrasound  Planned therapy interventions: abdominal trunk stabilization, ADL retraining, balance/weight-bearing training, body mechanics training, fine motor coordination training, flexibility, functional ROM exercises, gait training, home exercise program, IADL retraining, joint mobilization, manual therapy, motor coordination training, neuromuscular re-education, postural training, soft  tissue mobilization, spinal/joint mobilization, strengthening, stretching, therapeutic activities and transfer training  Frequency: 3x week  Duration in weeks: 12  Treatment plan discussed with: patient      See flowsheets for treatment detail.  Education: POC, pathoanatomy, rationale, HEP; vestibular, visual, and cervical    History # of Personal Factors and/or Comorbidities: MODERATE (1-2)  Examination of Body System(s): # of elements: MODERATE (3)  Clinical Presentation: EVOLVING  Clinical Decision Making: MODERATE      Timed:         Manual Therapy:         mins  23878;     Therapeutic Exercise:         mins  28604;     Neuromuscular Stefanie:        mins  21533;    Therapeutic Activity:          mins  63496;     Gait Training:           mins  94513;     Ultrasound:          mins  15170;    Ionto                                   mins   24436  Self Care                       30     mins   21005      Un-Timed:  Electrical Stimulation:         mins  99268 ( );  Dry Needling          mins self-pay  Traction          mins 58763  Low Eval          Mins  92565  Mod Eval     30     Mins  04051  High Eval                            Mins  04307  Re-Eval                               mins  56780      Timed Treatment:   30   mins   Total Treatment:     60   mins    PT SIGNATURE: Roman Phillpis PT   [place] PT license #: [id number]  DATE TREATMENT INITIATED: 1/24/2025    Initial Certification  Certification Period: 4/23/2025  I certify that the therapy services are furnished while this patient is under my care.  The services outlined above are required by this patient, and will be reviewed every 90 days.    PHYSICIAN: Ang Dai APRN  NPI: [id number]                                      DATE:     Please sign and return via fax to [facility] - Fax #: [phone]. Thank you, [facility] Physical Therapy.

## 2025-01-28 ENCOUNTER — TREATMENT (OUTPATIENT)
Dept: PHYSICAL THERAPY | Facility: CLINIC | Age: 70
End: 2025-01-28
Payer: COMMERCIAL

## 2025-01-28 DIAGNOSIS — R42 DIZZINESS: Primary | ICD-10-CM

## 2025-01-28 DIAGNOSIS — R42 VERTIGO: ICD-10-CM

## 2025-01-28 DIAGNOSIS — Z74.09 IMPAIRED FUNCTIONAL MOBILITY, BALANCE, GAIT, AND ENDURANCE: ICD-10-CM

## 2025-01-28 DIAGNOSIS — M53.9 CERVICAL DYSFUNCTION: ICD-10-CM

## 2025-01-28 DIAGNOSIS — R26.89 BALANCE PROBLEM: ICD-10-CM

## 2025-01-28 DIAGNOSIS — H81.09 MENIERE'S DISEASE, UNSPECIFIED LATERALITY: ICD-10-CM

## 2025-01-28 NOTE — PROGRESS NOTES
Physical Therapy Daily Treatment Note  [facility] Therapy  [facility], [address], [place] [address]    Patient: Christina Traore  : 1955  Referring practitioner: Ang Dai APRN  Today's Date: 2025    VISIT#: 2    Visit Diagnoses:    ICD-10-CM ICD-9-CM   1. Dizziness  R42 780.4   2. Balance problem  R26.89 781.99   3. Cervical dysfunction  M53.9 723.9   4. Meniere's disease, unspecified laterality  H81.09 386.00   5. Impaired functional mobility, balance, gait, and endurance  Z74.09 V49.89   6. Vertigo  R42 780.4     Patient consents to be observed by student/job shadow for this date of service and student/job shadow understands the legal limitations of their participation in treatment.      Subjective   Christina Traore reports: that she did some of the exercises and was dizzy for most of the weekend.      Objective     See Exercise, Manual, and Modality Logs for complete treatment.     Patient Education: HEP update - focus on gentle, small movement, slow movement exercises; not too little, not too much.      Assessment/Plan  Pt required frequent rest periods during and between exercises due to onset of dizziness. She was provided education and cuing for more gentle performance and smaller, slower movements to control symptom response. Added isometrics for gentle cervical strengthening and relaxation, tolerated well.    Progress per Plan of Care        Timed:         Manual Therapy:         mins  89870;     Therapeutic Exercise:         mins  03409;     Neuromuscular Stefanie:    30    mins  76279;    Therapeutic Activity:          mins  95469;     Gait Training:           mins  53640;     Ultrasound:          mins  13375;    Ionto:                                   mins  22431  Self Care:                       10     mins  87026    Un-Timed:  Canalith repositioning: ___ mins 46156  Electrical Stimulation:         mins  93848 ( );  Dry Needling           mins self-pay  Traction          mins 70179  Re-Eval                               mins  67810  Group Therapy           ___ mins 41045    Timed Treatment:   40   mins   Total Treatment:     40   mins    Roman Phillips PT  Physical Therapist  Rachael GARRETT license #: [id number]

## 2025-01-31 ENCOUNTER — TREATMENT (OUTPATIENT)
Dept: PHYSICAL THERAPY | Facility: CLINIC | Age: 70
End: 2025-01-31
Payer: COMMERCIAL

## 2025-01-31 DIAGNOSIS — R42 VERTIGO: ICD-10-CM

## 2025-01-31 DIAGNOSIS — H81.09 MENIERE'S DISEASE, UNSPECIFIED LATERALITY: ICD-10-CM

## 2025-01-31 DIAGNOSIS — R26.89 BALANCE PROBLEM: ICD-10-CM

## 2025-01-31 DIAGNOSIS — Z74.09 IMPAIRED FUNCTIONAL MOBILITY, BALANCE, GAIT, AND ENDURANCE: ICD-10-CM

## 2025-01-31 DIAGNOSIS — R42 DIZZINESS: Primary | ICD-10-CM

## 2025-01-31 DIAGNOSIS — M53.9 CERVICAL DYSFUNCTION: ICD-10-CM

## 2025-01-31 NOTE — PROGRESS NOTES
Physical Therapy Daily Treatment Note  [facility] Physical Therapy  [facility], [address], [place] [address]    Patient: Christina Traore  : 1955  Referring practitioner: Ang Dai APRN  Today's Date: 2025    VISIT#: 3    Visit Diagnoses:    ICD-10-CM ICD-9-CM   1. Dizziness  R42 780.4   2. Balance problem  R26.89 781.99   3. Cervical dysfunction  M53.9 723.9   4. Meniere's disease, unspecified laterality  H81.09 386.00   5. Impaired functional mobility, balance, gait, and endurance  Z74.09 V49.89   6. Vertigo  R42 780.4       Subjective   Christina Traore reports: that she feels that her dizziness has been less frequent and less intense over the last few days. She notes that she had a situation where she almost bumped into a co-worker that would normally have caused a lot of dizziness lasting a while, but that she felt a little dizzy only briefly and then was ok afterward. She has been compliant with the HEP tasks.      Objective     See Exercise, Manual, and Modality Logs for complete treatment.     Patient Education: HEP update; progressions and plans to challenge; 3 systems (vision, vestibular, somatosensory) with primary issues neck, vision      Assessment/Plan  Pt tolerated session with intermittent dizziness particularly in unsupported cervical position (sitting) with visual challenge. She immediately improved with external fixation to cervical region. Added several activities and progressed to flat pillow instead of folded with good overall tolerance today.    Progress per Plan of Care        Timed:         Manual Therapy:    8     mins  78485;     Therapeutic Exercise:         mins  58340;     Neuromuscular Stefanie:    30    mins  66399;    Therapeutic Activity:          mins  95573;     Gait Training:           mins  73493;     Ultrasound:          mins  52243;    Ionto:                                   mins  42848  Self Care:                        10     mins  21714    Un-Timed:  Canalith repositioning: ___ mins 66676  Electrical Stimulation:         mins  02419 ( );  Dry Needling          mins self-pay  Traction          mins 47624  Re-Eval                               mins  70725  Group Therapy           ___ mins 12589    Timed Treatment:   48   mins   Total Treatment:     48   mins    Roman Phillips PT  Physical Therapist  [place]achael GARRETT license #: [id number]

## 2025-02-04 ENCOUNTER — TELEPHONE (OUTPATIENT)
Dept: ORTHOPEDICS | Facility: OTHER | Age: 70
End: 2025-02-04
Payer: COMMERCIAL

## 2025-02-07 ENCOUNTER — TREATMENT (OUTPATIENT)
Dept: PHYSICAL THERAPY | Facility: CLINIC | Age: 70
End: 2025-02-07
Payer: COMMERCIAL

## 2025-02-07 DIAGNOSIS — R42 DIZZINESS: Primary | ICD-10-CM

## 2025-02-07 DIAGNOSIS — R26.89 BALANCE PROBLEM: ICD-10-CM

## 2025-02-07 DIAGNOSIS — H81.09 MENIERE'S DISEASE, UNSPECIFIED LATERALITY: ICD-10-CM

## 2025-02-07 DIAGNOSIS — Z74.09 IMPAIRED FUNCTIONAL MOBILITY, BALANCE, GAIT, AND ENDURANCE: ICD-10-CM

## 2025-02-07 DIAGNOSIS — M53.9 CERVICAL DYSFUNCTION: ICD-10-CM

## 2025-02-07 NOTE — PROGRESS NOTES
Physical Therapy Daily Treatment Note  [facility] Physical Therapy  [facility], [address], [place] [address]    Patient: Christina Traore  : 1955  Referring practitioner: Ang Dai APRN  Today's Date: 2025    VISIT#: 4    Visit Diagnoses:    ICD-10-CM ICD-9-CM   1. Dizziness  R42 780.4   2. Balance problem  R26.89 781.99   3. Cervical dysfunction  M53.9 723.9   4. Meniere's disease, unspecified laterality  H81.09 386.00   5. Impaired functional mobility, balance, gait, and endurance  Z74.09 V49.89       Subjective   Christina Traore reports: that she has been unwell, thinks its a respiratory virus of some sort. She has still done some of the exercises since getting sick. She feels like her dizziness is less severe and less frequently provoked than it had been previously.      Objective   Pt severely dizzy with staggering gait requiring min A for safe ambulation in busy clinic environment.    See Exercise, Manual, and Modality Logs for complete treatment.     Patient Education: [facility] [place]      Assessment/Plan  Pt tolerated session well overall after initial extreme dizziness from walking through busy PT gym. She had some dizziness and was anxious during maze exercise, but upon return to private treatment room she had improvements in tolerance to all activities. Progressions of resisted UE exercise was performed well with cuing.    Progress per Plan of Care        Timed:         Manual Therapy:         mins  02797;     Therapeutic Exercise:         mins  28950;     Neuromuscular Stefanie:    25    mins  79759;    Therapeutic Activity:     10     mins  61538;     Gait Training:           mins  50809;     Ultrasound:          mins  95676;    Ionto:                                   mins  47306  Self Care:                            mins  36374    Un-Timed:  Canalith repositioning: ___ mins 27154  Electrical Stimulation:         mins  24354 (DARYA  );  Dry Needling          mins self-pay  Traction          mins 54313  Re-Eval                               mins  11535  Group Therapy           ___ mins 83630    Timed Treatment:   35   mins   Total Treatment:     35   mins    Roman Phillips PT  Physical Therapist  Rachael GARRETT license #: [id number]

## 2025-02-11 ENCOUNTER — TREATMENT (OUTPATIENT)
Dept: PHYSICAL THERAPY | Facility: CLINIC | Age: 70
End: 2025-02-11
Payer: COMMERCIAL

## 2025-02-11 DIAGNOSIS — R42 VERTIGO: ICD-10-CM

## 2025-02-11 DIAGNOSIS — R26.89 BALANCE PROBLEM: ICD-10-CM

## 2025-02-11 DIAGNOSIS — R42 DIZZINESS: Primary | ICD-10-CM

## 2025-02-11 DIAGNOSIS — M53.9 CERVICAL DYSFUNCTION: ICD-10-CM

## 2025-02-11 DIAGNOSIS — Z74.09 IMPAIRED FUNCTIONAL MOBILITY, BALANCE, GAIT, AND ENDURANCE: ICD-10-CM

## 2025-02-11 DIAGNOSIS — H81.09 MENIERE'S DISEASE, UNSPECIFIED LATERALITY: ICD-10-CM

## 2025-02-11 PROCEDURE — 97530 THERAPEUTIC ACTIVITIES: CPT | Performed by: PHYSICAL THERAPIST

## 2025-02-11 PROCEDURE — 97112 NEUROMUSCULAR REEDUCATION: CPT | Performed by: PHYSICAL THERAPIST

## 2025-02-11 NOTE — PROGRESS NOTES
Physical Therapy Daily Treatment Note  [facility] Physical Therapy  [facility], [address], [place] [address]    Patient: Christina Traore  : 1955  Referring practitioner: Ang Dai APRN  Today's Date: 2025    VISIT#: 5    Visit Diagnoses:    ICD-10-CM ICD-9-CM   1. Dizziness  R42 780.4   2. Balance problem  R26.89 781.99   3. Cervical dysfunction  M53.9 723.9   4. Meniere's disease, unspecified laterality  H81.09 386.00   5. Impaired functional mobility, balance, gait, and endurance  Z74.09 V49.89   6. Vertigo  R42 780.4       Subjective   Christina Traore reports: that she had a bad wave of dizziness Saturday morning where she felt that she was about to pass out, had a lot of nausea. She called her son to come to her house and ended up sleeping about 6 hours.  she woke up at baseline.  She notes that driving in the snow only bothered her a little, but the [employer] wipers did affect her. She notes concussion from fall in .      Objective     See Exercise, Manual, and Modality Logs for complete treatment.     Patient Education: HEP update; referral to vision center      Assessment/Plan  Pt noted some left eye sharp pains following seated exercises today. With cuing for repeated left eye motion with brief holds, she noted some easing of symptom. She tolerated most exercises very well however had some exacerbation of dizziness with maze task, and VRT tasks to a lesser degree. Discussed referral to [facility] [place] with pt agreeable.    Progress per Plan of Care        Timed:         Manual Therapy:         mins  83509;     Therapeutic Exercise:         mins  43092;     Neuromuscular Stefanie:    20    mins  37465;    Therapeutic Activity:     10     mins  46254;     Gait Training:           mins  41288;     Ultrasound:          mins  84273;    Ionto:                                   mins  38459  Self Care:                            mins   05037    Un-Timed:  Canalith repositioning: ___ mins 35759  Electrical Stimulation:         mins  59278 ( );  Dry Needling          mins self-pay  Traction          mins 14690  Re-Eval                               mins  36112  Group Therapy           ___ mins 27443    Timed Treatment:   30   mins   Total Treatment:     45   mins    Roman Phillips PT  Physical Therapist  Rachael GARRETT license #: [id number]

## 2025-02-14 ENCOUNTER — TREATMENT (OUTPATIENT)
Dept: PHYSICAL THERAPY | Facility: CLINIC | Age: 70
End: 2025-02-14
Payer: COMMERCIAL

## 2025-02-14 DIAGNOSIS — Z74.09 IMPAIRED FUNCTIONAL MOBILITY, BALANCE, GAIT, AND ENDURANCE: ICD-10-CM

## 2025-02-14 DIAGNOSIS — R26.89 BALANCE PROBLEM: ICD-10-CM

## 2025-02-14 DIAGNOSIS — R42 VERTIGO: ICD-10-CM

## 2025-02-14 DIAGNOSIS — H81.09 MENIERE'S DISEASE, UNSPECIFIED LATERALITY: ICD-10-CM

## 2025-02-14 DIAGNOSIS — R42 DIZZINESS: Primary | ICD-10-CM

## 2025-02-14 DIAGNOSIS — M53.9 CERVICAL DYSFUNCTION: ICD-10-CM

## 2025-02-14 NOTE — PROGRESS NOTES
Physical Therapy Daily Treatment Note  [facility] Physical Therapy  [facility], [address], [place] [address]    Patient: Christina Traore  : 1955  Referring practitioner: Ang Dai APRN  Today's Date: 2025    VISIT#: 6    Visit Diagnoses:    ICD-10-CM ICD-9-CM   1. Dizziness  R42 780.4   2. Balance problem  R26.89 781.99   3. Cervical dysfunction  M53.9 723.9   4. Meniere's disease, unspecified laterality  H81.09 386.00   5. Impaired functional mobility, balance, gait, and endurance  Z74.09 V49.89   6. Vertigo  R42 780.4       Subjective   Christina Traore reports: that she had a couple of occasions in the last few days where she almost bumped into a co-worker while walking and found that she was not as unsteady and dizzy as she previously would have been. She's also been able to bend over to pick something up from the floor without feeling as dizzy.  She notes that she's tolerating the near/far switching exercise fairly well. She has noted some lower cervical tightness this morning.      Objective     See Exercise, Manual, and Modality Logs for complete treatment.     Patient Education: HEP update; progression to standing; exposure to clinic      Assessment/Plan  Pt tolerated progression to standing for VRT type tasks relatively well overall with frequent rests to recover. She was taken to open gym area with music playing for several seated UE tasks with increasing difficulty and generalized discomfort evident. After rest period in dark private room she reported recovery.    Progress per Plan of Care        Timed:         Manual Therapy:         mins  74172;     Therapeutic Exercise:         mins  61765;     Neuromuscular Stefanie:    15    mins  42109;    Therapeutic Activity:     25     mins  99896;     Gait Training:           mins  50441;     Ultrasound:          mins  18867;    Ionto:                                   mins  78269  Self Care:                             mins  95098    Un-Timed:  Canalith repositioning: ___ mins 62834  Electrical Stimulation:         mins  35558 ( );  Dry Needling          mins self-pay  Traction          mins 05814  Re-Eval                               mins  56421  Group Therapy           ___ mins 55896    Timed Treatment:   40   mins   Total Treatment:     40   mins    Roman Phillips PT  Physical Therapist  Rachael GARRETT license #: [id number]

## 2025-02-18 ENCOUNTER — TREATMENT (OUTPATIENT)
Dept: PHYSICAL THERAPY | Facility: CLINIC | Age: 70
End: 2025-02-18
Payer: COMMERCIAL

## 2025-02-18 DIAGNOSIS — R42 DIZZINESS: Primary | ICD-10-CM

## 2025-02-18 DIAGNOSIS — R26.89 BALANCE PROBLEM: ICD-10-CM

## 2025-02-18 DIAGNOSIS — Z74.09 IMPAIRED FUNCTIONAL MOBILITY, BALANCE, GAIT, AND ENDURANCE: ICD-10-CM

## 2025-02-18 DIAGNOSIS — H81.09 MENIERE'S DISEASE, UNSPECIFIED LATERALITY: ICD-10-CM

## 2025-02-18 DIAGNOSIS — M53.9 CERVICAL DYSFUNCTION: ICD-10-CM

## 2025-02-18 DIAGNOSIS — R42 VERTIGO: ICD-10-CM

## 2025-02-18 NOTE — PROGRESS NOTES
Physical Therapy Daily Treatment Note  [facility] Physical Therapy  [facility], [address], [place] [address]    Patient: Christina Traore  : 1955  Referring practitioner: Ang Dai APRN  Today's Date: 2025    VISIT#: 7    Visit Diagnoses:    ICD-10-CM ICD-9-CM   1. Dizziness  R42 780.4   2. Balance problem  R26.89 781.99   3. Cervical dysfunction  M53.9 723.9   4. Meniere's disease, unspecified laterality  H81.09 386.00   5. Impaired functional mobility, balance, gait, and endurance  Z74.09 V49.89   6. Vertigo  R42 780.4       Subjective   Christina Traore reports: that she didn't feel too bad after last session where she was exposed to busy gym environment. She's been doing her VRT exercises in standing and doing ok with them. She's feeling pleased with her progress.      Objective     See Exercise, Manual, and Modality Logs for complete treatment.     Patient Education: HEP update and progressions      Assessment/Plan  Pt noted the most difficulty this session during maze task, with single eye version causing a lot of fatigue and mild discomfort. With rest she was able to continue without issue. Her tandem balance was poor with a lot of instability requiring intermittent UE support, but appears safe enough for HEP.    Progress per Plan of Care        Timed:         Manual Therapy:         mins  46004;     Therapeutic Exercise:         mins  06855;     Neuromuscular Stefanie:    30    mins  08991;    Therapeutic Activity:     15     mins  64266;     Gait Training:           mins  11375;     Ultrasound:          mins  72096;    Ionto:                                   mins  36910  Self Care:                            mins  58032    Un-Timed:  Canalith repositioning: ___ mins 70363  Electrical Stimulation:         mins  24375 ( );  Dry Needling          mins self-pay  Traction          mins 28681  Re-Eval                               mins  79995  Group  Therapy           ___ mins 61209    Timed Treatment:   45   mins   Total Treatment:     45   mins    Roman Phillips PT  Physical Therapist  Rachael GARRETT license #: [id number]

## 2025-02-21 ENCOUNTER — TREATMENT (OUTPATIENT)
Dept: PHYSICAL THERAPY | Facility: CLINIC | Age: 70
End: 2025-02-21
Payer: COMMERCIAL

## 2025-02-21 DIAGNOSIS — R42 DIZZINESS: Primary | ICD-10-CM

## 2025-02-21 DIAGNOSIS — M53.9 CERVICAL DYSFUNCTION: ICD-10-CM

## 2025-02-21 DIAGNOSIS — R26.89 BALANCE PROBLEM: ICD-10-CM

## 2025-02-21 DIAGNOSIS — H81.09 MENIERE'S DISEASE, UNSPECIFIED LATERALITY: ICD-10-CM

## 2025-02-21 DIAGNOSIS — Z74.09 IMPAIRED FUNCTIONAL MOBILITY, BALANCE, GAIT, AND ENDURANCE: ICD-10-CM

## 2025-02-21 DIAGNOSIS — R42 VERTIGO: ICD-10-CM

## 2025-02-21 NOTE — PROGRESS NOTES
Physical Therapy Daily Treatment Note  [facility] Physical Therapy  [facility], [address], [place] [address]    Patient: Christina Traore  : 1955  Referring practitioner: Ang Dai APRN  Today's Date: 2025    VISIT#: 8    Visit Diagnoses:    ICD-10-CM ICD-9-CM   1. Dizziness  R42 780.4   2. Balance problem  R26.89 781.99   3. Cervical dysfunction  M53.9 723.9   4. Meniere's disease, unspecified laterality  H81.09 386.00   5. Impaired functional mobility, balance, gait, and endurance  Z74.09 V49.89   6. Vertigo  R42 780.4       Subjective   Christina Traore reports: that she's improving her tolerance with each exercise at home, and has been working on her tandem balance a lot. She feels like she's doing well.      Objective     See Exercise, Manual, and Modality Logs for complete treatment.     Patient Education: gradual progressions; aiming for slight provocation of symptoms at all times      Assessment/Plan  Pt tolerated session with continued difficulty during maze activity and with brighter lights in private room during supine exercises. She is still finding relief with eyes closed in supine position, but overall is tolerating more stimulus prior to onset of dizziness/instability.     Progress per Plan of Care        Timed:         Manual Therapy:         mins  90985;     Therapeutic Exercise:         mins  75910;     Neuromuscular Stefanie:    20    mins  75452;    Therapeutic Activity:     10     mins  35788;     Gait Training:           mins  59383;     Ultrasound:          mins  06646;    Ionto:                                   mins  69264  Self Care:                            mins  01988    Un-Timed:  Canalith repositioning: ___ mins 91304  Electrical Stimulation:         mins  45023 ( );  Dry Needling          mins self-pay  Traction          mins 23564  Re-Eval                               mins  53183  Group Therapy           ___ mins  16787    Timed Treatment:   30   mins   Total Treatment:     50   mins    Roman Phillips PT  Physical Therapist  Rachael GARRETT license #: [id number]

## 2025-02-25 ENCOUNTER — TREATMENT (OUTPATIENT)
Dept: PHYSICAL THERAPY | Facility: CLINIC | Age: 70
End: 2025-02-25
Payer: COMMERCIAL

## 2025-02-25 DIAGNOSIS — R42 VERTIGO: ICD-10-CM

## 2025-02-25 DIAGNOSIS — H81.09 MENIERE'S DISEASE, UNSPECIFIED LATERALITY: ICD-10-CM

## 2025-02-25 DIAGNOSIS — M53.9 CERVICAL DYSFUNCTION: ICD-10-CM

## 2025-02-25 DIAGNOSIS — R42 DIZZINESS: Primary | ICD-10-CM

## 2025-02-25 DIAGNOSIS — Z74.09 IMPAIRED FUNCTIONAL MOBILITY, BALANCE, GAIT, AND ENDURANCE: ICD-10-CM

## 2025-02-25 DIAGNOSIS — R26.89 BALANCE PROBLEM: ICD-10-CM

## 2025-02-25 NOTE — PROGRESS NOTES
Physical Therapy Daily Treatment and Progress Note  [facility] Physical Therapy  [facility], [address], [place] [address]    Patient: Christina Traore  : 1955  Referring practitioner: Ang Dai APRN  Today's Date: 2025    VISIT#: 9    Visit Diagnoses:    ICD-10-CM ICD-9-CM   1. Dizziness  R42 780.4   2. Balance problem  R26.89 781.99   3. Cervical dysfunction  M53.9 723.9   4. Meniere's disease, unspecified laterality  H81.09 386.00   5. Impaired functional mobility, balance, gait, and endurance  Z74.09 V49.89   6. Vertigo  R42 780.4     DHI: 62%    Subjective   Christina Traore reports: that she feels as though she's made around 40-50% improvements since the start of her POC. She notes that after last session she was able to go to work and [employer] the same day without being too fatigued to also see family. At the end of the day she was tired but not bad, and was able to function the next day. She feels like she's doing better moving around at work without getting dizzy.        Objective          Active Range of Motion     Additional Active Range of Motion Details  Cervical  Flex - 100%  Ext - 70% lightheadedness mild on return to neutral  L SB - 100%  R SB - 100%   L rot - 100%  R rot - 100%    Passive Range of Motion     Additional Passive Range of Motion Details  Cervical extension - 100% with external fixation, no dizziness noted    Ambulation     Observational Gait     Additional Observational Gait Details  Pt has staggering gait pattern with frequent need for guarding or use of wall/furniture support for safety. She demonstrated more difficulty in complex environment and with auditory distraction.    Functional Assessment     Comments  Visual Fields - WNL  Smooth pursuit - minor losses of fixation with no dizziness symptoms horizontally, no fixation difficulty vertical   Saccadic motion - WNL    VOR (head shake) - WNL, slight unsteadiness upon  cessation  VOR cancellation - moderate dizziness, no difficulty with focus and reports of blurring of target    Cervical Dizziness Differentiation Test - WNL    External cervical fixation - no change, no dizziness at baseline in static sitting    Balance  Feet together - intermittent moderate trunk sway, no UE support required  Single leg balance - ~3 seconds each side  Tandem stance - L forward > 30 seconds, R forward > 30 seconds        See Exercise, Manual, and Modality Logs for complete treatment.     Patient Education: assessment findings and progress; HEP update        Assessment & Plan       Assessment  Assessment details: Pt continues to present with complaints of dizziness. She self-reports ~40-50% improvements in symptoms and function since the start of her POC. Her DHI score has improved from 80% to 62% disability, and she notes improving tolerance of function at work and with ADLs.  She has demonstrated reduction in dizziness and instability response during vestibular testing, and she has improved cervical ROM and tolerance to cervical mobility testing. Her static balance measures are improving, however she still notes increases in dizziness and instability with increased visual or auditory inputs.  She has met 2/3 STGs for therapy with progress toward her remaining goals.  She will continue to benefit from skilled PT services to address her ongoing deficits and facilitate a return to her PLOF.  Prognosis: good    Goals  Plan Goals: SHORT TERM GOALS: Time for Goal Achievement Options: 4 weeks    1.  Patient to be compliant with HEP and tolerate only minimal side effects. MET  2.  Pt able to demonstrate the ability to sit to stand without dizziness or light headedness to improve safety with transfers.   3.  Pt able to demonstrate a good ability to perform static balance in standing within clinic gym environment to improve safety within basic work environment.  PROGRESSING    LONG TERM GOALS: Time for Goal  Achievement Options: 2 months  1.  Pt to report minimal to no vertigo symptoms with all activities.  2.  Pt able to ambulate across clinic with head rotation and no dizziness, nausea or other side effects for improving ambulation in community and house.  3.  Patient to score < 30% on Dizziness Handicap Inventory demonstrating a decreased perception of handicap.      Plan  Therapy options: will be seen for skilled therapy services  Treatment plan discussed with: patient      Progress per Plan of Care        Timed:         Manual Therapy:         mins  13392;     Therapeutic Exercise:         mins  54286;     Neuromuscular Stefanie:    25    mins  05678;    Therapeutic Activity:     10     mins  68882;     Gait Training:           mins  19713;     Ultrasound:          mins  71786;    Ionto:                                   mins  86532  Self Care:                       10     mins  17533    Un-Timed:  Canalith repositioning: ___ mins 00977  Electrical Stimulation:         mins  82559 ( );  Dry Needling          mins self-pay  Traction          mins 70136  Re-Eval                               mins  46710  Group Therapy           ___ mins 63886    Timed Treatment:   45   mins   Total Treatment:     45   mins    Roman Phillips PT  Physical Therapist  Rachael GARRETT license #: [id number]

## 2025-02-25 NOTE — LETTER
Physical Therapy Progress Note  [facility] Physical Therapy  [facility], [address], [place] [address]    Patient: Christina Traore  : 1955  Referring practitioner: Ang Dai APRN  Today's Date: 2025    VISIT#: 9    Visit Diagnoses:    ICD-10-CM ICD-9-CM   1. Dizziness  R42 780.4   2. Balance problem  R26.89 781.99   3. Cervical dysfunction  M53.9 723.9   4. Meniere's disease, unspecified laterality  H81.09 386.00   5. Impaired functional mobility, balance, gait, and endurance  Z74.09 V49.89   6. Vertigo  R42 780.4     DHI: 62%    Subjective   Christina Traore reports: that she feels as though she's made around 40-50% improvements since the start of her POC. She notes that after last session she was able to go to work and [employer] the same day without being too fatigued to also see family. At the end of the day she was tired but not bad, and was able to function the next day. She feels like she's doing better moving around at work without getting dizzy.        Objective      Active Range of Motion     Additional Active Range of Motion Details  Cervical  Flex - 100%  Ext - 70% lightheadedness mild on return to neutral  L SB - 100%  R SB - 100%   L rot - 100%  R rot - 100%    Passive Range of Motion     Additional Passive Range of Motion Details  Cervical extension - 100% with external fixation, no dizziness noted    Ambulation     Observational Gait     Additional Observational Gait Details  Pt has staggering gait pattern with frequent need for guarding or use of wall/furniture support for safety. She demonstrated more difficulty in complex environment and with auditory distraction.    Functional Assessment     Comments  Visual Fields - WNL  Smooth pursuit - minor losses of fixation with no dizziness symptoms horizontally, no fixation difficulty vertical   Saccadic motion - WNL    VOR (head shake) - WNL, slight unsteadiness upon cessation  VOR cancellation -  moderate dizziness, no difficulty with focus and reports of blurring of target    Cervical Dizziness Differentiation Test - WNL    External cervical fixation - no change, no dizziness at baseline in static sitting    Balance  Feet together - intermittent moderate trunk sway, no UE support required  Single leg balance - ~3 seconds each side  Tandem stance - L forward > 30 seconds, R forward > 30 seconds          Assessment & Plan       Assessment  Assessment details: Pt continues to present with complaints of dizziness. She self-reports ~40-50% improvements in symptoms and function since the start of her POC. Her DHI score has improved from 80% to 62% disability, and she notes improving tolerance of function at work and with ADLs.  She has demonstrated reduction in dizziness and instability response during vestibular testing, and she has improved cervical ROM and tolerance to cervical mobility testing. Her static balance measures are improving, however she still notes increases in dizziness and instability with increased visual or auditory inputs.  She has met 2/3 STGs for therapy with progress toward her remaining goals.  She will continue to benefit from skilled PT services to address her ongoing deficits and facilitate a return to her PLOF.  Prognosis: good    Goals  Plan Goals: SHORT TERM GOALS: Time for Goal Achievement Options: 4 weeks    1.  Patient to be compliant with HEP and tolerate only minimal side effects. MET  2.  Pt able to demonstrate the ability to sit to stand without dizziness or light headedness to improve safety with transfers.   3.  Pt able to demonstrate a good ability to perform static balance in standing within clinic gym environment to improve safety within basic work environment.  PROGRESSING    LONG TERM GOALS: Time for Goal Achievement Options: 2 months  1.  Pt to report minimal to no vertigo symptoms with all activities.  2.  Pt able to ambulate across clinic with head rotation and no  dizziness, nausea or other side effects for improving ambulation in community and house.  3.  Patient to score < 30% on Dizziness Handicap Inventory demonstrating a decreased perception of handicap.      Plan  Therapy options: will be seen for skilled therapy services  Treatment plan discussed with: patient      Progress per Plan of Care      Roman Phillips, PT  Physical Therapist  [place] [facility] license #: [id number]

## 2025-02-28 ENCOUNTER — TREATMENT (OUTPATIENT)
Dept: PHYSICAL THERAPY | Facility: CLINIC | Age: 70
End: 2025-02-28
Payer: COMMERCIAL

## 2025-02-28 DIAGNOSIS — R26.89 BALANCE PROBLEM: ICD-10-CM

## 2025-02-28 DIAGNOSIS — R42 VERTIGO: ICD-10-CM

## 2025-02-28 DIAGNOSIS — R42 DIZZINESS: Primary | ICD-10-CM

## 2025-02-28 DIAGNOSIS — H81.09 MENIERE'S DISEASE, UNSPECIFIED LATERALITY: ICD-10-CM

## 2025-02-28 DIAGNOSIS — Z74.09 IMPAIRED FUNCTIONAL MOBILITY, BALANCE, GAIT, AND ENDURANCE: ICD-10-CM

## 2025-02-28 DIAGNOSIS — M53.9 CERVICAL DYSFUNCTION: ICD-10-CM

## 2025-02-28 NOTE — PROGRESS NOTES
Physical Therapy Daily Treatment Note  [facility] Physical Therapy  [facility], [address], [place] [id number]    Patient: Christina Traore  : 1955  Referring practitioner: Ang Dai APRN  Today's Date: 2025    VISIT#: 10    Visit Diagnoses:    ICD-10-CM ICD-9-CM   1. Dizziness  R42 780.4   2. Balance problem  R26.89 781.99   3. Cervical dysfunction  M53.9 723.9   4. Meniere's disease, unspecified laterality  H81.09 386.00   5. Impaired functional mobility, balance, gait, and endurance  Z74.09 V49.89   6. Vertigo  R42 780.4       Subjective   Christina Traore reports: that she was able to drive through a tunnel slowly with her radio playing music without significant dizziness/overload sensation.      Objective     See Exercise, Manual, and Modality Logs for complete treatment.     Patient Education: continue HEP; progressions of sensory input      Assessment/Plan  Pt noted mild headache today during session after consecutive tasks requiring cervical actions, somewhat eased with supine rest period and chin tucks. She tolerated further gradual progressions of visual, auditory, and vestibular inputs with her exercises fairly well overall with some rest periods required intermittently.    Progress per Plan of Care        Timed:         Manual Therapy:         mins  57385;     Therapeutic Exercise:         mins  24207;     Neuromuscular Stefanie:    25    mins  42285;    Therapeutic Activity:     15     mins  40984;     Gait Training:           mins  52667;     Ultrasound:          mins  89064;    Ionto:                                   mins  21577  Self Care:                            mins  07787    Un-Timed:  Canalith repositioning: ___ mins 14794  Electrical Stimulation:         mins  30204 ( );  Dry Needling          mins self-pay  Traction          mins 52613  Re-Eval                               mins  60828  Group Therapy           ___ mins  16638    Timed Treatment:   40   mins   Total Treatment:     40   mins    Roman Phillips PT  Physical Therapist  Rachael GARRETT license #: [id number]

## 2025-03-04 ENCOUNTER — TREATMENT (OUTPATIENT)
Dept: PHYSICAL THERAPY | Facility: CLINIC | Age: 70
End: 2025-03-04
Payer: COMMERCIAL

## 2025-03-04 DIAGNOSIS — R42 VERTIGO: ICD-10-CM

## 2025-03-04 DIAGNOSIS — H81.09 MENIERE'S DISEASE, UNSPECIFIED LATERALITY: ICD-10-CM

## 2025-03-04 DIAGNOSIS — R42 DIZZINESS: Primary | ICD-10-CM

## 2025-03-04 DIAGNOSIS — R26.89 BALANCE PROBLEM: ICD-10-CM

## 2025-03-04 DIAGNOSIS — M53.9 CERVICAL DYSFUNCTION: ICD-10-CM

## 2025-03-04 DIAGNOSIS — Z74.09 IMPAIRED FUNCTIONAL MOBILITY, BALANCE, GAIT, AND ENDURANCE: ICD-10-CM

## 2025-03-04 NOTE — PROGRESS NOTES
Physical Therapy Daily Treatment Note  [facility]  [facility], [address], [place] [address]    Patient: Christina Traore  : 1955  Referring practitioner: Ang Dai APRN  Today's Date: 3/4/2025    VISIT#: 11    Visit Diagnoses:    ICD-10-CM ICD-9-CM   1. Dizziness  R42 780.4   2. Balance problem  R26.89 781.99   3. Cervical dysfunction  M53.9 723.9   4. Meniere's disease, unspecified laterality  H81.09 386.00   5. Impaired functional mobility, balance, gait, and endurance  Z74.09 V49.89   6. Vertigo  R42 780.4       Subjective   Christina Traore reports: that she had an episode while standing in line outside the [employer] on the weekend where she became dizzy and required support from a family member. She was then taken indoors and was able to sit down which helped.      Objective     See Exercise, Manual, and Modality Logs for complete treatment.     Patient Education: HEP updates      Assessment/Plan  Pt tolerated  exercises well today in private room with music initially. After performance of maze exercises in open clinic she had some instability while returning to private room as she rounded the corner into busy clinic environment requiring mild support from PT. She also noted dizziness and needed some support when attempting standing forward bend exercises therefore this was modified to seated for today.    Progress per Plan of Care        Timed:         Manual Therapy:         mins  48407;     Therapeutic Exercise:         mins  74590;     Neuromuscular Stefanie:    30    mins  59016;    Therapeutic Activity:     30     mins  14595;     Gait Training:           mins  23656;     Ultrasound:          mins  15024;    Ionto:                                   mins  82746  Self Care:                            mins  27984    Un-Timed:  Canalith repositioning: ___ mins 79167  Electrical Stimulation:         mins  55481 ( );  Dry Needling          mins  self-pay  Traction          mins 53259  Re-Eval                               mins  91330  Group Therapy           ___ mins 04424    Timed Treatment:   60   mins   Total Treatment:     60   mins    Roman Phillips PT  Physical Therapist  Rachael GARRETT license #: [id number]

## 2025-03-11 ENCOUNTER — TREATMENT (OUTPATIENT)
Dept: PHYSICAL THERAPY | Facility: CLINIC | Age: 70
End: 2025-03-11
Payer: COMMERCIAL

## 2025-03-11 DIAGNOSIS — R42 DIZZINESS: Primary | ICD-10-CM

## 2025-03-11 DIAGNOSIS — H81.09 MENIERE'S DISEASE, UNSPECIFIED LATERALITY: ICD-10-CM

## 2025-03-11 DIAGNOSIS — R26.89 BALANCE PROBLEM: ICD-10-CM

## 2025-03-11 DIAGNOSIS — M53.9 CERVICAL DYSFUNCTION: ICD-10-CM

## 2025-03-11 DIAGNOSIS — Z74.09 IMPAIRED FUNCTIONAL MOBILITY, BALANCE, GAIT, AND ENDURANCE: ICD-10-CM

## 2025-03-11 DIAGNOSIS — R42 VERTIGO: ICD-10-CM

## 2025-03-11 NOTE — PROGRESS NOTES
Physical Therapy Daily Treatment Note  [facility] Physical Therapy  [facility], [address], [place] [address]    Patient: Christina Traore  : 1955  Referring practitioner: Ang Dai APRN  Today's Date: 3/11/2025    VISIT#: 12    Visit Diagnoses:    ICD-10-CM ICD-9-CM   1. Dizziness  R42 780.4   2. Balance problem  R26.89 781.99   3. Cervical dysfunction  M53.9 723.9   4. Meniere's disease, unspecified laterality  H81.09 386.00   5. Impaired functional mobility, balance, gait, and endurance  Z74.09 V49.89   6. Vertigo  R42 780.4     Patient consents to be observed by student/job shadow for this date of service and student/job shadow understands the legal limitations of their participation in treatment.      Subjective   Christina Traore reports: that she has a laser now, and thinks that she may have overdone it as she became dizzy last night after two sessions in a short period. She ended up going to bed after that. She has appointment Thursday this week with [employer] [facility]. She reports that she was also feeling off for the rest of the day after the new toe touch exercise added last session. She has done them since with less of a prolonged response.      Objective     See Exercise, Manual, and Modality Logs for complete treatment.     Patient Education: HEP update      Assessment/Plan  Pt was provoked today significantly by VOR cancellation with music, walking through clinic lap x 1, and attempt at x2 exercise. Modified HEP to include single leg balance and open books, tolerated well today. She shows continued improvements in overall performance and tolerance to challenge with combinations of auditory, vestibular, proprioceptive, and visual inputs.    Progress per Plan of Care        Timed:         Manual Therapy:         mins  13645;     Therapeutic Exercise:         mins  45139;     Neuromuscular Stefanie:    25    mins  14563;    Therapeutic Activity:     25      mins  27022;     Gait Training:           mins  54325;     Ultrasound:          mins  63869;    Ionto:                                   mins  47243  Self Care:                       5     mins  55416    Un-Timed:  Canalith repositioning: ___ mins 79565  Electrical Stimulation:         mins  42561 ( );  Dry Needling          mins self-pay  Traction          mins 70247  Re-Eval                               mins  50781  Group Therapy           ___ mins 16100    Timed Treatment:   55   mins   Total Treatment:     55   mins    Roman Phillips PT  Physical Therapist  Rachael GARRETT license #: [id number]

## 2025-03-18 ENCOUNTER — TREATMENT (OUTPATIENT)
Dept: PHYSICAL THERAPY | Facility: CLINIC | Age: 70
End: 2025-03-18
Payer: COMMERCIAL

## 2025-03-18 DIAGNOSIS — R42 VERTIGO: ICD-10-CM

## 2025-03-18 DIAGNOSIS — M53.9 CERVICAL DYSFUNCTION: ICD-10-CM

## 2025-03-18 DIAGNOSIS — R26.89 BALANCE PROBLEM: ICD-10-CM

## 2025-03-18 DIAGNOSIS — Z74.09 IMPAIRED FUNCTIONAL MOBILITY, BALANCE, GAIT, AND ENDURANCE: ICD-10-CM

## 2025-03-18 DIAGNOSIS — H81.09 MENIERE'S DISEASE, UNSPECIFIED LATERALITY: ICD-10-CM

## 2025-03-18 DIAGNOSIS — R42 DIZZINESS: Primary | ICD-10-CM
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
## 2025-04-01 ENCOUNTER — TREATMENT (OUTPATIENT)
Dept: PHYSICAL THERAPY | Facility: CLINIC | Age: 70
End: 2025-04-01
Payer: COMMERCIAL

## 2025-04-01 DIAGNOSIS — Z74.09 IMPAIRED FUNCTIONAL MOBILITY, BALANCE, GAIT, AND ENDURANCE: ICD-10-CM

## 2025-04-01 DIAGNOSIS — M53.9 CERVICAL DYSFUNCTION: ICD-10-CM

## 2025-04-01 DIAGNOSIS — R26.89 BALANCE PROBLEM: ICD-10-CM

## 2025-04-01 DIAGNOSIS — H81.09 MENIERE'S DISEASE, UNSPECIFIED LATERALITY: ICD-10-CM

## 2025-04-01 DIAGNOSIS — R42 DIZZINESS: Primary | ICD-10-CM

## 2025-04-01 DIAGNOSIS — R42 VERTIGO: ICD-10-CM

## 2025-04-01 PROCEDURE — 97530 THERAPEUTIC ACTIVITIES: CPT | Performed by: PHYSICAL THERAPIST

## 2025-04-01 PROCEDURE — 97112 NEUROMUSCULAR REEDUCATION: CPT | Performed by: PHYSICAL THERAPIST

## 2025-04-01 NOTE — PROGRESS NOTES
Physical Therapy Daily Treatment Note  [facility] Physical Therapy  [facility], [address], [place] [address]    Patient: Christina Traore  : 1955  Referring practitioner: Ang Dai APRN  Today's Date: 2025    VISIT#: 14    Visit Diagnoses:    ICD-10-CM ICD-9-CM   1. Dizziness  R42 780.4   2. Balance problem  R26.89 781.99   3. Cervical dysfunction  M53.9 723.9   4. Meniere's disease, unspecified laterality  H81.09 386.00   5. Impaired functional mobility, balance, gait, and endurance  Z74.09 V49.89   6. Vertigo  R42 780.4       Subjective   Christina Traore reports: that she's been doing a lot of walking at her work in a busy environment to expose herself to visual stimuli. She thinks she's improving a lot. Ready to spend a whole session in open clinic environment.      Objective     See Exercise, Manual, and Modality Logs for complete treatment.     Patient Education: continue HEP, add walking VRT      Assessment/Plan  Pt tolerated session very well today with exceptional tolerance to in-clinic exercise. She required some periods of seated static rest but was able to walk with less difficulty in open, busy clinic environment and performed balance and VRT tasks without significant reproduction of symptoms. Excellent progress noted.    Progress per Plan of Care        Timed:         Manual Therapy:         mins  09438;     Therapeutic Exercise:         mins  65138;     Neuromuscular Stefanie:    20    mins  18887;    Therapeutic Activity:     10     mins  75204;     Gait Training:           mins  74692;     Ultrasound:          mins  99631;    Ionto:                                   mins  19766  Self Care:                            mins  05710    Un-Timed:  Canalith repositioning: ___ mins 07378  Electrical Stimulation:         mins  98344 ( );  Dry Needling          mins self-pay  Traction          mins 30094  Re-Eval                               mins   62935  Group Therapy           ___ mins 30257    Timed Treatment:   30   mins   Total Treatment:     30   mins    Roman Phillips PT  Physical Therapist  Rachael GARRETT license #: [id number]

## 2025-04-08 ENCOUNTER — TREATMENT (OUTPATIENT)
Dept: PHYSICAL THERAPY | Facility: CLINIC | Age: 70
End: 2025-04-08
Payer: COMMERCIAL

## 2025-04-08 DIAGNOSIS — M53.9 CERVICAL DYSFUNCTION: ICD-10-CM

## 2025-04-08 DIAGNOSIS — R42 DIZZINESS: Primary | ICD-10-CM

## 2025-04-08 DIAGNOSIS — H81.09 MENIERE'S DISEASE, UNSPECIFIED LATERALITY: ICD-10-CM

## 2025-04-08 DIAGNOSIS — R42 VERTIGO: ICD-10-CM

## 2025-04-08 DIAGNOSIS — Z74.09 IMPAIRED FUNCTIONAL MOBILITY, BALANCE, GAIT, AND ENDURANCE: ICD-10-CM

## 2025-04-08 DIAGNOSIS — R26.89 BALANCE PROBLEM: ICD-10-CM

## 2025-04-08 PROCEDURE — 97112 NEUROMUSCULAR REEDUCATION: CPT | Performed by: PHYSICAL THERAPIST

## 2025-04-08 PROCEDURE — 97535 SELF CARE MNGMENT TRAINING: CPT | Performed by: PHYSICAL THERAPIST

## 2025-04-08 PROCEDURE — 97530 THERAPEUTIC ACTIVITIES: CPT | Performed by: PHYSICAL THERAPIST

## 2025-04-08 NOTE — PROGRESS NOTES
Physical Therapy Daily Treatment and Progress Note  [facility] Physical Therapy  [facility], [address], [place] [address]    Patient: Christina Traore  : 1955  Referring practitioner: Ang Dai APRN  Today's Date: 2025    VISIT#: 15    Visit Diagnoses:    ICD-10-CM ICD-9-CM   1. Dizziness  R42 780.4   2. Balance problem  R26.89 781.99   3. Cervical dysfunction  M53.9 723.9   4. Meniere's disease, unspecified laterality  H81.09 386.00   5. Impaired functional mobility, balance, gait, and endurance  Z74.09 V49.89   6. Vertigo  R42 780.4     DHI: 18%    Subjective   Christina Traore reports: that she went to the mall on , was able to visit 3 stores with a break in her car in between each, did ok with the busy and loud environment. She feels like her exercises were going very well. She feels like she's 70-75% improved since the start of her POC.      Objective          Active Range of Motion     Additional Active Range of Motion Details  Cervical  Flex - 100%  Ext - 80% slight instability on return to neutral  L SB - 100%  R SB - 100%   L rot - 100%  R rot - 100%    Passive Range of Motion     Additional Passive Range of Motion Details  Cervical extension - 100% with external fixation, no dizziness noted    Ambulation     Observational Gait     Additional Observational Gait Details  Pt able to ambulate through complex clinic environment without gait disturbances.    Functional Assessment     Comments  Visual Fields - WNL  Smooth pursuit - WNL   Saccadic motion - WNL    VOR (head shake) - WNL  VOR cancellation - WNL    Cervical Dizziness Differentiation Test - WNL    External cervical fixation - no change, no dizziness at baseline in static sitting    Balance  Feet together - WNL  Single leg balance - >15 seconds each side  Tandem stance - L forward > 30 seconds, R forward > 30 seconds    TUG - pt notes instability with staggering gait after rapid turn,  worse turning left than right. STS process with head turn (or forward bow type movement) increases sense of instability.        See Exercise, Manual, and Modality Logs for complete treatment.     Patient Education: assessment findings and progress; HEP Update        Assessment & Plan       Assessment  Assessment details: Pt continues to present with complaints of dizziness. She self-reports ~70-75% improvements in symptoms and function since the start of her POC. Her DHI score has improved from 80% to 18% disability, and she notes increased functional ability with tolerable dizziness when going to the mall and into busy stores.  She has demonstrated reduction in dizziness and instability response during vestibular testing, and she has improved cervical ROM and tolerance to cervical mobility testing. Her static balance measures have significantly, however she still notes increases in dizziness and instability with dynamic testing.  She has met 3/3 STGs and 2/3 LTGs for therapy with progress toward her remaining goals.  She will continue to benefit from skilled PT services to address her ongoing deficits and facilitate a return to her PLOF.  Prognosis: good    Goals  Plan Goals: SHORT TERM GOALS: Time for Goal Achievement Options: 4 weeks    1.  Patient to be compliant with HEP and tolerate only minimal side effects. MET  2.  Pt able to demonstrate the ability to sit to stand without dizziness or light headedness to improve safety with transfers. MET  3.  Pt able to demonstrate a good ability to perform static balance in standing within clinic gym environment to improve safety within basic work environment.  MET    LONG TERM GOALS: Time for Goal Achievement Options: 2 months  1.  Pt to report minimal to no vertigo symptoms with all activities.  2.  Pt able to ambulate across clinic with head rotation and no dizziness, nausea or other side effects for improving ambulation in community and house. MET  3.  Patient to  score < 30% on Dizziness Handicap Inventory demonstrating a decreased perception of handicap. MET      Plan  Therapy options: will be seen for skilled therapy services  Treatment plan discussed with: patient      Progress per Plan of Care        Timed:         Manual Therapy:         mins  66254;     Therapeutic Exercise:         mins  63513;     Neuromuscular Stefanie:    10    mins  86810;    Therapeutic Activity:     10     mins  88976;     Gait Training:           mins  79484;     Ultrasound:          mins  01885;    Ionto:                                   mins  64894  Self Care:                       10     mins  61950    Un-Timed:  Canalith repositioning: ___ mins 43770  Electrical Stimulation:         mins  66800 ( );  Dry Needling          mins self-pay  Traction          mins 39424  Re-Eval                               mins  47620  Group Therapy           ___ mins 90806    Timed Treatment:   30   mins   Total Treatment:     50   mins    Roman Phillips PT  Physical Therapist  Rachael GARRETT license #: [id number]

## 2025-04-08 NOTE — LETTER
Physical Therapy Progress Note  [facility] Physical Therapy  [facility], [address], [place] [address]    Patient: Christina Traore  : 1955  Referring practitioner: Ang Dai APRN  Today's Date: 2025    VISIT#: 15    Visit Diagnoses:    ICD-10-CM ICD-9-CM   1. Dizziness  R42 780.4   2. Balance problem  R26.89 781.99   3. Cervical dysfunction  M53.9 723.9   4. Meniere's disease, unspecified laterality  H81.09 386.00   5. Impaired functional mobility, balance, gait, and endurance  Z74.09 V49.89   6. Vertigo  R42 780.4     DHI: 18%    Subjective   Christina Traore reports: that she went to the mall on , was able to visit 3 stores with a break in her car in between each, did ok with the busy and loud environment. She feels like her exercises were going very well. She feels like she's 70-75% improved since the start of her POC.      Objective          Active Range of Motion     Additional Active Range of Motion Details  Cervical  Flex - 100%  Ext - 80% slight instability on return to neutral  L SB - 100%  R SB - 100%   L rot - 100%  R rot - 100%    Passive Range of Motion     Additional Passive Range of Motion Details  Cervical extension - 100% with external fixation, no dizziness noted    Ambulation     Observational Gait     Additional Observational Gait Details  Pt able to ambulate through complex clinic environment without gait disturbances.    Functional Assessment     Comments  Visual Fields - WNL  Smooth pursuit - WNL   Saccadic motion - WNL    VOR (head shake) - WNL  VOR cancellation - WNL    Cervical Dizziness Differentiation Test - WNL    External cervical fixation - no change, no dizziness at baseline in static sitting    Balance  Feet together - WNL  Single leg balance - >15 seconds each side  Tandem stance - L forward > 30 seconds, R forward > 30 seconds    TUG - pt notes instability with staggering gait after rapid turn, worse turning left  than right. STS process with head turn (or forward bow type movement) increases sense of instability.          Assessment & Plan       Assessment  Assessment details: Pt continues to present with complaints of dizziness. She self-reports ~70-75% improvements in symptoms and function since the start of her POC. Her DHI score has improved from 80% to 18% disability, and she notes increased functional ability with tolerable dizziness when going to the mall and into busy stores.  She has demonstrated reduction in dizziness and instability response during vestibular testing, and she has improved cervical ROM and tolerance to cervical mobility testing. Her static balance measures have significantly, however she still notes increases in dizziness and instability with dynamic testing.  She has met 3/3 STGs and 2/3 LTGs for therapy with progress toward her remaining goals.  She will continue to benefit from skilled PT services to address her ongoing deficits and facilitate a return to her PLOF.  Prognosis: good    Goals  Plan Goals: SHORT TERM GOALS: Time for Goal Achievement Options: 4 weeks    1.  Patient to be compliant with HEP and tolerate only minimal side effects. MET  2.  Pt able to demonstrate the ability to sit to stand without dizziness or light headedness to improve safety with transfers. MET  3.  Pt able to demonstrate a good ability to perform static balance in standing within clinic gym environment to improve safety within basic work environment.  MET    LONG TERM GOALS: Time for Goal Achievement Options: 2 months  1.  Pt to report minimal to no vertigo symptoms with all activities.  2.  Pt able to ambulate across clinic with head rotation and no dizziness, nausea or other side effects for improving ambulation in community and house. MET  3.  Patient to score < 30% on Dizziness Handicap Inventory demonstrating a decreased perception of handicap. MET      Plan  Therapy options: will be seen for skilled therapy  services  Treatment plan discussed with: patient      Progress per Plan of Care      Roman Phillips PT  Physical Therapist  Rachael GARRETT license #: [id number]

## 2025-04-22 ENCOUNTER — TREATMENT (OUTPATIENT)
Dept: PHYSICAL THERAPY | Facility: CLINIC | Age: 70
End: 2025-04-22
Payer: COMMERCIAL

## 2025-04-22 DIAGNOSIS — R42 DIZZINESS: Primary | ICD-10-CM

## 2025-04-22 DIAGNOSIS — R42 VERTIGO: ICD-10-CM

## 2025-04-22 DIAGNOSIS — H81.09 MENIERE'S DISEASE, UNSPECIFIED LATERALITY: ICD-10-CM

## 2025-04-22 DIAGNOSIS — Z74.09 IMPAIRED FUNCTIONAL MOBILITY, BALANCE, GAIT, AND ENDURANCE: ICD-10-CM

## 2025-04-22 DIAGNOSIS — R26.89 BALANCE PROBLEM: ICD-10-CM

## 2025-04-22 DIAGNOSIS — M53.9 CERVICAL DYSFUNCTION: ICD-10-CM

## 2025-04-22 PROCEDURE — 97112 NEUROMUSCULAR REEDUCATION: CPT | Performed by: PHYSICAL THERAPIST

## 2025-04-22 PROCEDURE — 97530 THERAPEUTIC ACTIVITIES: CPT | Performed by: PHYSICAL THERAPIST

## 2025-04-22 NOTE — PROGRESS NOTES
Physical Therapy Daily Treatment Note  [facility] Physical Therapy  [facility], [address], [place] [address]    Patient: Christina Traore  : 1955  Referring practitioner: Ang Dai APRN  Today's Date: 2025    VISIT#: 16    Visit Diagnoses:    ICD-10-CM ICD-9-CM   1. Dizziness  R42 780.4   2. Balance problem  R26.89 781.99   3. Cervical dysfunction  M53.9 723.9   4. Meniere's disease, unspecified laterality  H81.09 386.00   5. Impaired functional mobility, balance, gait, and endurance  Z74.09 V49.89   6. Vertigo  R42 780.4       Subjective   Christina Traore reports: that she is much less dizzy lately even with loud noises nearby. She's been practicing rapid direction changes and no longer has dizziness with this exercise.       Objective   Walking gaze stabilization   Horizontal - WNL  Vertical - provokes mild instability    Walking maximal head motion  Horizontal - provokes mild instability  Vertical - significant dizziness    Walking VOR cancellation - mild instability    See Exercise, Manual, and Modality Logs for complete treatment.     Patient Education: HEP update; continued progressions      Assessment/Plan  Pt tolerated session well overall with ongoing dizziness provoked by progressions of walking exercises. She required some seated rest time and regression of exercises due to overall level of provocation, but was able to tolerated a moderate degree of HEP progression today.    Progress per Plan of Care        Timed:         Manual Therapy:         mins  35511;     Therapeutic Exercise:         mins  22597;     Neuromuscular Stefanie:    25    mins  91106;    Therapeutic Activity:     15     mins  18861;     Gait Training:           mins  99359;     Ultrasound:          mins  68230;    Ionto:                                   mins  98181  Self Care:                            mins  43917    Un-Timed:  Canalith repositioning: ___ mins 82584  Electrical  Stimulation:         mins  47072 ( );  Dry Needling          mins self-pay  Traction          mins 58366  Re-Eval                               mins  04892  Group Therapy           ___ mins 90993    Timed Treatment:   40   mins   Total Treatment:     40   mins    Roman Phillips PT  Physical Therapist  Rachael GARRETT license #: [id number]

## 2025-04-29 ENCOUNTER — TREATMENT (OUTPATIENT)
Dept: PHYSICAL THERAPY | Facility: CLINIC | Age: 70
End: 2025-04-29
Payer: COMMERCIAL

## 2025-04-29 DIAGNOSIS — Z74.09 IMPAIRED FUNCTIONAL MOBILITY, BALANCE, GAIT, AND ENDURANCE: ICD-10-CM

## 2025-04-29 DIAGNOSIS — R26.89 BALANCE PROBLEM: ICD-10-CM

## 2025-04-29 DIAGNOSIS — H81.09 MENIERE'S DISEASE, UNSPECIFIED LATERALITY: ICD-10-CM

## 2025-04-29 DIAGNOSIS — R42 DIZZINESS: Primary | ICD-10-CM

## 2025-04-29 DIAGNOSIS — R42 VERTIGO: ICD-10-CM

## 2025-04-29 DIAGNOSIS — M53.9 CERVICAL DYSFUNCTION: ICD-10-CM

## 2025-04-29 PROCEDURE — 97112 NEUROMUSCULAR REEDUCATION: CPT | Performed by: PHYSICAL THERAPIST

## 2025-04-29 NOTE — PROGRESS NOTES
Re-Certification/Plan of Care    [facility] Physical Therapy   [facility], [address], [place] [address]    Patient: Christina Traore   : 1955  Referring practitioner: Ang Dai APRN  Date of Initial Visit: Type: THERAPY  Noted: 2025  Today's Date: 2025  Patient seen for 17 sessions      Visit Diagnoses:    ICD-10-CM ICD-9-CM   1. Dizziness  R42 780.4   2. Balance problem  R26.89 781.99   3. Cervical dysfunction  M53.9 723.9   4. Meniere's disease, unspecified laterality  H81.09 386.00   5. Impaired functional mobility, balance, gait, and endurance  Z74.09 V49.89   6. Vertigo  R42 780.4       Subjective Questionnaire: DHI: 14%    Subjective:   Subjective   Christina Traore reports: that her new exercises started off slow but have improved since last session. She has been dealing with a lot of congestion lately as well which has made things a little more challenging. She feels that she's around 75% improved since the start of her POC.    Clinical Progress: improved  Home Program Compliance: Yes        Objective          Active Range of Motion     Additional Active Range of Motion Details  Cervical  Flex - 100%  Ext - 100%  L SB - 100%  R SB - 100%   L rot - 100%  R rot - 100%    Ambulation     Observational Gait     Additional Observational Gait Details  Pt able to ambulate through complex clinic environment without gait disturbances.    Functional Assessment     Comments  Visual Fields - WNL  Smooth pursuit - WNL   Saccadic motion - WNL  Convergence - mild instability, closest focal distance ~8 inches    VOR (head shake) - WNL  VOR cancellation - WNL    Walking VOR/VOR cancellation - mild instability, worse with larger head movements particularly in vertical plane  Maximal head turns while walking - moderate instability, worse in horizontal plane    Cervical Dizziness Differentiation Test - WNL    External cervical fixation - no change, no dizziness at  baseline in static sitting    Balance  Feet together - WNL  Single leg balance - >30 seconds each side  Tandem stance - L forward > 30 seconds, R forward > 30 seconds    TUG - 9 seconds, no dizziness            Assessment & Plan       Assessment  Functional limitations: walking   Assessment details: Pt continues to present with complaints of dizziness. She self-reports ~75% improvements in symptoms and function since the start of her POC. Her DHI score has improved from 80% to 14% disability, and she notes increased functional ability with only occasional mild dizziness when going to the mall and into busy stores.  She has demonstrated reduction in dizziness and instability response during vestibular testing, and she has improved cervical ROM and tolerance to cervical mobility testing. Her static balance measures have significantly, however she still notes increases in dizziness and instability with dynamic testing particularly with larger motions.  She has met 3/3 STGs and 2/3 LTGs for therapy with progress toward her remaining goals.  She will continue to benefit from skilled PT services to address her ongoing deficits and facilitate a return to her PLOF.  Prognosis: good    Goals  Plan Goals: SHORT TERM GOALS: Time for Goal Achievement Options: 4 weeks    1.  Patient to be compliant with HEP and tolerate only minimal side effects. MET  2.  Pt able to demonstrate the ability to sit to stand without dizziness or light headedness to improve safety with transfers. MET  3.  Pt able to demonstrate a good ability to perform static balance in standing within clinic gym environment to improve safety within basic work environment.  MET    LONG TERM GOALS: Time for Goal Achievement Options: 2 months  1.  Pt to report minimal to no vertigo symptoms with all activities.  2.  Pt able to ambulate across clinic with head rotation and no dizziness, nausea or other side effects for improving ambulation in community and house.  MET  3.  Patient to score < 30% on Dizziness Handicap Inventory demonstrating a decreased perception of handicap. MET      Plan  Therapy options: will be seen for skilled therapy services  Planned therapy interventions: abdominal trunk stabilization, ADL retraining, balance/weight-bearing training, body mechanics training, fine motor coordination training, gait training, IADL retraining, home exercise program, motor coordination training, neuromuscular re-education, postural training, therapeutic activities and transfer training  Other planned therapy interventions: Group Therapy  Frequency: 1x week  Treatment plan discussed with: patient      Progress toward previous goals: Partially Met        Recommendations: Continue as planned  Certification Period: 7/27/2025  Prognosis to achieve goals: good    PT Signature: Roman Phillips, PT  [place] [facility] license #: [id number]    Based upon review of the patient's progress and continued therapy plan, it is my medical opinion that Christina Traore should continue physical therapy treatment at [facility]  [address] [place] [id number] [phone].    Signature: __________________________________  Ang Dai APRN  Please sign and return via fax to [facility] - Fax #: [phone]. Thank you, [facility].    Timed:         Manual Therapy:         mins  07470;     Therapeutic Exercise:         mins  76760;     Neuromuscular Stefanie:    30    mins  81919;    Therapeutic Activity:          mins  76890;     Gait Training:           mins  69976;     Ultrasound:          mins  98321;    Ionto                                   mins   86156  Self Care                            mins   66370      Un-Timed:  Electrical Stimulation:         mins  31328 ( );  Dry Needling          mins self-pay  Traction          mins 29820  Low Eval          Mins  44444  Mod Eval          Mins  94998  High Eval                             Mins  57558  Re-Eval                               mins  54492  Group Therapy           ____ mins 66077      Timed Treatment:   30   mins   Total Treatment:     40   mins

## 2025-05-06 ENCOUNTER — TREATMENT (OUTPATIENT)
Dept: PHYSICAL THERAPY | Facility: CLINIC | Age: 70
End: 2025-05-06
Payer: COMMERCIAL

## 2025-05-06 DIAGNOSIS — Z74.09 IMPAIRED FUNCTIONAL MOBILITY, BALANCE, GAIT, AND ENDURANCE: ICD-10-CM

## 2025-05-06 DIAGNOSIS — R26.89 BALANCE PROBLEM: ICD-10-CM

## 2025-05-06 DIAGNOSIS — H81.09 MENIERE'S DISEASE, UNSPECIFIED LATERALITY: ICD-10-CM

## 2025-05-06 DIAGNOSIS — R42 VERTIGO: ICD-10-CM

## 2025-05-06 DIAGNOSIS — R42 DIZZINESS: Primary | ICD-10-CM

## 2025-05-06 DIAGNOSIS — M53.9 CERVICAL DYSFUNCTION: ICD-10-CM

## 2025-05-06 PROCEDURE — 97535 SELF CARE MNGMENT TRAINING: CPT | Performed by: PHYSICAL THERAPIST

## 2025-05-06 PROCEDURE — 97112 NEUROMUSCULAR REEDUCATION: CPT | Performed by: PHYSICAL THERAPIST

## 2025-05-06 NOTE — PROGRESS NOTES
Physical Therapy Daily Treatment Note and Discharge  [facility] Physical Therapy  [facility], [address], [place] [address]    Patient: Christina Traore  : 1955  Referring practitioner: Ang Dai APRN  Today's Date: 2025    VISIT#: 18    Visit Diagnoses:    ICD-10-CM ICD-9-CM   1. Dizziness  R42 780.4   2. Balance problem  R26.89 781.99   3. Cervical dysfunction  M53.9 723.9   4. Meniere's disease, unspecified laterality  H81.09 386.00   5. Impaired functional mobility, balance, gait, and endurance  Z74.09 V49.89   6. Vertigo  R42 780.4       Subjective   Christina Traore reports: that she's feeling even better since last session. She notes she was able to go into [employer] without any issues. She's been tolerating loud music better. Has been a little foggy from allergies/pollen but doing well overall.  She feels like she now has the understanding of her HEP and pathology to be discharged from therapy.      Objective     See Exercise, Manual, and Modality Logs for complete treatment.     Patient Education: discharge education; ongoing progressions, add strengthening and cardiovascular stress via gym exercise      Assessment & Plan       Assessment  Assessment details: Pt demonstrates minimal instability or difficulty with ambulatory VRT tasks today, including rapid large head turns while walking fast in busy clinic environment. She reported no dizziness with standing forward bends to maximally challenge vestibular positioning, including with head rotation.   She has a good understanding of her pathology and HEP, making her suitable for discharge at this time.  Prognosis: good    Plan  Therapy options: will not be seen for skilled therapy services  Treatment plan discussed with: patient  Plan details: Discharge to [facility]              Timed:         Manual Therapy:         mins  86667;     Therapeutic Exercise:         mins  24542;     Neuromuscular Stefanie:    10     mins  21992;    Therapeutic Activity:          mins  56571;     Gait Training:           mins  40418;     Ultrasound:          mins  48652;    Ionto:                                   mins  68782  Self Care:                       10     mins  01890    Un-Timed:  Canalith repositioning: ___ mins 37629  Electrical Stimulation:         mins  68176 ( );  Dry Needling          mins self-pay  Traction          mins 45933  Re-Eval                               mins  76538  Group Therapy           ___ mins 36154    Timed Treatment:   20   mins   Total Treatment:     20   mins    Roman Phillips PT  Physical Therapist  [facility] license #: [id number]

## 2025-06-21 ENCOUNTER — APPOINTMENT (OUTPATIENT)
Dept: GENERAL RADIOLOGY | Facility: HOSPITAL | Age: 70
End: 2025-06-21
Payer: COMMERCIAL

## 2025-06-21 LAB
BASOPHILS # BLD AUTO: 0.02 10*3/MM3 (ref 0–0.2)
BASOPHILS NFR BLD AUTO: 0.3 % (ref 0–1.5)
DEPRECATED RDW RBC AUTO: 43.1 FL (ref 37–54)
EOSINOPHIL # BLD AUTO: 0.15 10*3/MM3 (ref 0–0.4)
EOSINOPHIL NFR BLD AUTO: 2.5 % (ref 0.3–6.2)
ERYTHROCYTE [DISTWIDTH] IN BLOOD BY AUTOMATED COUNT: 13.1 % (ref 12.3–15.4)
HCT VFR BLD AUTO: 38.4 % (ref 34–46.6)
HGB BLD-MCNC: 12.5 G/DL (ref 12–15.9)
IMM GRANULOCYTES # BLD AUTO: 0.01 10*3/MM3 (ref 0–0.05)
IMM GRANULOCYTES NFR BLD AUTO: 0.2 % (ref 0–0.5)
LYMPHOCYTES # BLD AUTO: 2.13 10*3/MM3 (ref 0.7–3.1)
LYMPHOCYTES NFR BLD AUTO: 34.9 % (ref 19.6–45.3)
MCH RBC QN AUTO: 29.6 PG (ref 26.6–33)
MCHC RBC AUTO-ENTMCNC: 32.6 G/DL (ref 31.5–35.7)
MCV RBC AUTO: 90.8 FL (ref 79–97)
MONOCYTES # BLD AUTO: 0.49 10*3/MM3 (ref 0.1–0.9)
MONOCYTES NFR BLD AUTO: 8 % (ref 5–12)
NEUTROPHILS NFR BLD AUTO: 3.31 10*3/MM3 (ref 1.7–7)
NEUTROPHILS NFR BLD AUTO: 54.1 % (ref 42.7–76)
NRBC BLD AUTO-RTO: 0 /100 WBC (ref 0–0.2)
PLATELET # BLD AUTO: 193 10*3/MM3 (ref 140–450)
PMV BLD AUTO: 10.4 FL (ref 6–12)
RBC # BLD AUTO: 4.23 10*6/MM3 (ref 3.77–5.28)
WBC NRBC COR # BLD AUTO: 6.11 10*3/MM3 (ref 3.4–10.8)

## 2025-06-21 PROCEDURE — 93010 ELECTROCARDIOGRAM REPORT: CPT | Performed by: INTERNAL MEDICINE

## 2025-06-21 PROCEDURE — 85025 COMPLETE CBC W/AUTO DIFF WBC: CPT

## 2025-06-21 PROCEDURE — 71045 X-RAY EXAM CHEST 1 VIEW: CPT

## 2025-06-21 PROCEDURE — 93005 ELECTROCARDIOGRAM TRACING: CPT

## 2025-06-21 PROCEDURE — 99285 EMERGENCY DEPT VISIT HI MDM: CPT

## 2025-06-21 PROCEDURE — 84484 ASSAY OF TROPONIN QUANT: CPT

## 2025-06-21 PROCEDURE — 93005 ELECTROCARDIOGRAM TRACING: CPT | Performed by: EMERGENCY MEDICINE

## 2025-06-21 PROCEDURE — 80053 COMPREHEN METABOLIC PANEL: CPT

## 2025-06-21 PROCEDURE — 36415 COLL VENOUS BLD VENIPUNCTURE: CPT

## 2025-06-21 RX ORDER — SODIUM CHLORIDE 0.9 % (FLUSH) 0.9 %
10 SYRINGE (ML) INJECTION AS NEEDED
Status: DISCONTINUED | OUTPATIENT
Start: 2025-06-21 | End: 2025-06-22 | Stop reason: HOSPADM

## 2025-06-21 RX ORDER — ASPIRIN 325 MG
325 TABLET ORAL ONCE
Status: COMPLETED | OUTPATIENT
Start: 2025-06-21 | End: 2025-06-22

## 2025-06-22 ENCOUNTER — HOSPITAL ENCOUNTER (OUTPATIENT)
Facility: HOSPITAL | Age: 70
Setting detail: OBSERVATION
Discharge: HOME | End: 2025-06-22
Attending: EMERGENCY MEDICINE | Admitting: EMERGENCY MEDICINE
Payer: COMMERCIAL

## 2025-06-22 ENCOUNTER — APPOINTMENT (OUTPATIENT)
Dept: NUCLEAR MEDICINE | Facility: HOSPITAL | Age: 70
End: 2025-06-22
Payer: COMMERCIAL

## 2025-06-22 VITALS
HEIGHT: 68 IN | HEART RATE: 63 BPM | TEMPERATURE: 97.9 F | SYSTOLIC BLOOD PRESSURE: 122 MMHG | DIASTOLIC BLOOD PRESSURE: 64 MMHG | OXYGEN SATURATION: 96 % | WEIGHT: 152.6 LBS | RESPIRATION RATE: 18 BRPM | BODY MASS INDEX: 23.13 KG/M2

## 2025-06-22 DIAGNOSIS — R07.9 CHEST PAIN, UNSPECIFIED TYPE: Primary | ICD-10-CM

## 2025-06-22 LAB
ALBUMIN SERPL-MCNC: 4.2 G/DL (ref 3.5–5.2)
ALBUMIN/GLOB SERPL: 1.3 G/DL
ALP SERPL-CCNC: 129 U/L (ref 39–117)
ALT SERPL W P-5'-P-CCNC: 18 U/L (ref 1–33)
ANION GAP SERPL CALCULATED.3IONS-SCNC: 14 MMOL/L (ref 5–15)
AST SERPL-CCNC: 23 U/L (ref 1–32)
BH CV REST NUCLEAR ISOTOPE DOSE: 9.9 MCI
BH CV STRESS COMMENTS STAGE 1: NORMAL
BH CV STRESS DOSE REGADENOSON STAGE 1: 0.4
BH CV STRESS DURATION MIN STAGE 1: 0
BH CV STRESS DURATION SEC STAGE 1: 10
BH CV STRESS NUCLEAR ISOTOPE DOSE: 29.9 MCI
BH CV STRESS PROTOCOL 1: NORMAL
BH CV STRESS RECOVERY BP: NORMAL MMHG
BH CV STRESS RECOVERY HR: 69 BPM
BH CV STRESS STAGE 1: 1
BILIRUB SERPL-MCNC: 0.6 MG/DL (ref 0–1.2)
BUN SERPL-MCNC: 15 MG/DL (ref 8–23)
BUN/CREAT SERPL: 18.3 (ref 7–25)
CALCIUM SPEC-SCNC: 9.2 MG/DL (ref 8.6–10.5)
CHLORIDE SERPL-SCNC: 106 MMOL/L (ref 98–107)
CO2 SERPL-SCNC: 21 MMOL/L (ref 22–29)
CREAT SERPL-MCNC: 0.82 MG/DL (ref 0.57–1)
EGFRCR SERPLBLD CKD-EPI 2021: 77.5 ML/MIN/1.73
GEN 5 1HR TROPONIN T REFLEX: <6 NG/L
GLOBULIN UR ELPH-MCNC: 3.3 GM/DL
GLUCOSE SERPL-MCNC: 103 MG/DL (ref 65–99)
HOLD SPECIMEN: NORMAL
HOLD SPECIMEN: NORMAL
MAXIMAL PREDICTED HEART RATE: 151 BPM
PERCENT MAX PREDICTED HR: 61.59 %
POTASSIUM SERPL-SCNC: 3.5 MMOL/L (ref 3.5–5.2)
PROT SERPL-MCNC: 7.5 G/DL (ref 6–8.5)
QT INTERVAL: 456 MS
QTC INTERVAL: 470 MS
SODIUM SERPL-SCNC: 141 MMOL/L (ref 136–145)
SPECT HRT GATED+EF W RNC IV: 60 %
STRESS BASELINE BP: NORMAL MMHG
STRESS BASELINE HR: 56 BPM
STRESS PERCENT HR: 72 %
STRESS POST PEAK BP: NORMAL MMHG
STRESS POST PEAK HR: 93 BPM
STRESS TARGET HR: 128 BPM
TROPONIN T NUMERIC DELTA: NORMAL
TROPONIN T SERPL HS-MCNC: <6 NG/L
WHOLE BLOOD HOLD COAG: NORMAL
WHOLE BLOOD HOLD SPECIMEN: NORMAL

## 2025-06-22 PROCEDURE — A9502 TC99M TETROFOSMIN: HCPCS | Performed by: EMERGENCY MEDICINE

## 2025-06-22 PROCEDURE — G0378 HOSPITAL OBSERVATION PER HR: HCPCS

## 2025-06-22 PROCEDURE — 93018 CV STRESS TEST I&R ONLY: CPT | Performed by: INTERNAL MEDICINE

## 2025-06-22 PROCEDURE — 93017 CV STRESS TEST TRACING ONLY: CPT

## 2025-06-22 PROCEDURE — 99214 OFFICE O/P EST MOD 30 MIN: CPT | Performed by: INTERNAL MEDICINE

## 2025-06-22 PROCEDURE — 78452 HT MUSCLE IMAGE SPECT MULT: CPT | Performed by: INTERNAL MEDICINE

## 2025-06-22 PROCEDURE — 84484 ASSAY OF TROPONIN QUANT: CPT

## 2025-06-22 PROCEDURE — 25010000002 REGADENOSON 0.4 MG/5ML SOLUTION: Performed by: EMERGENCY MEDICINE

## 2025-06-22 PROCEDURE — 93016 CV STRESS TEST SUPVJ ONLY: CPT | Performed by: INTERNAL MEDICINE

## 2025-06-22 PROCEDURE — 34310000005 TECHNETIUM TETROFOSMIN KIT: Performed by: EMERGENCY MEDICINE

## 2025-06-22 PROCEDURE — 78452 HT MUSCLE IMAGE SPECT MULT: CPT

## 2025-06-22 RX ORDER — POTASSIUM CHLORIDE 1500 MG/1
40 TABLET, EXTENDED RELEASE ORAL EVERY 4 HOURS
Status: DISCONTINUED | OUTPATIENT
Start: 2025-06-22 | End: 2025-06-22

## 2025-06-22 RX ORDER — ONDANSETRON 2 MG/ML
4 INJECTION INTRAMUSCULAR; INTRAVENOUS EVERY 6 HOURS PRN
Status: DISCONTINUED | OUTPATIENT
Start: 2025-06-22 | End: 2025-06-22 | Stop reason: HOSPADM

## 2025-06-22 RX ORDER — REGADENOSON 0.08 MG/ML
0.4 INJECTION, SOLUTION INTRAVENOUS
Status: COMPLETED | OUTPATIENT
Start: 2025-06-22 | End: 2025-06-22

## 2025-06-22 RX ORDER — AMOXICILLIN 250 MG
2 CAPSULE ORAL 2 TIMES DAILY PRN
Status: DISCONTINUED | OUTPATIENT
Start: 2025-06-22 | End: 2025-06-22 | Stop reason: HOSPADM

## 2025-06-22 RX ORDER — BUSPIRONE HYDROCHLORIDE 10 MG/1
15 TABLET ORAL 3 TIMES DAILY
Status: DISCONTINUED | OUTPATIENT
Start: 2025-06-22 | End: 2025-06-22 | Stop reason: HOSPADM

## 2025-06-22 RX ORDER — POLYETHYLENE GLYCOL 3350 17 G/17G
17 POWDER, FOR SOLUTION ORAL DAILY PRN
Status: DISCONTINUED | OUTPATIENT
Start: 2025-06-22 | End: 2025-06-22 | Stop reason: HOSPADM

## 2025-06-22 RX ORDER — POTASSIUM &MAGNESIUM ASPARTATE 250-250 MG
CAPSULE ORAL 3 TIMES WEEKLY
COMMUNITY

## 2025-06-22 RX ORDER — SODIUM CHLORIDE 0.9 % (FLUSH) 0.9 %
10 SYRINGE (ML) INJECTION AS NEEDED
Status: DISCONTINUED | OUTPATIENT
Start: 2025-06-22 | End: 2025-06-22 | Stop reason: HOSPADM

## 2025-06-22 RX ORDER — LORATADINE 10 MG/1
10 TABLET ORAL DAILY
COMMUNITY

## 2025-06-22 RX ORDER — ESCITALOPRAM OXALATE 5 MG/1
5 TABLET ORAL DAILY
Status: DISCONTINUED | OUTPATIENT
Start: 2025-06-22 | End: 2025-06-22 | Stop reason: HOSPADM

## 2025-06-22 RX ORDER — BISACODYL 10 MG
10 SUPPOSITORY, RECTAL RECTAL DAILY PRN
Status: DISCONTINUED | OUTPATIENT
Start: 2025-06-22 | End: 2025-06-22 | Stop reason: HOSPADM

## 2025-06-22 RX ORDER — BISACODYL 5 MG/1
5 TABLET, DELAYED RELEASE ORAL DAILY PRN
Status: DISCONTINUED | OUTPATIENT
Start: 2025-06-22 | End: 2025-06-22 | Stop reason: HOSPADM

## 2025-06-22 RX ORDER — SODIUM CHLORIDE 9 MG/ML
40 INJECTION, SOLUTION INTRAVENOUS AS NEEDED
Status: DISCONTINUED | OUTPATIENT
Start: 2025-06-22 | End: 2025-06-22 | Stop reason: HOSPADM

## 2025-06-22 RX ORDER — ATORVASTATIN CALCIUM 10 MG/1
10 TABLET, FILM COATED ORAL NIGHTLY
Status: DISCONTINUED | OUTPATIENT
Start: 2025-06-22 | End: 2025-06-22 | Stop reason: HOSPADM

## 2025-06-22 RX ORDER — NITROGLYCERIN 0.4 MG/1
0.4 TABLET SUBLINGUAL
Status: DISCONTINUED | OUTPATIENT
Start: 2025-06-22 | End: 2025-06-22 | Stop reason: HOSPADM

## 2025-06-22 RX ORDER — ONDANSETRON 4 MG/1
4 TABLET, ORALLY DISINTEGRATING ORAL EVERY 6 HOURS PRN
Status: DISCONTINUED | OUTPATIENT
Start: 2025-06-22 | End: 2025-06-22 | Stop reason: HOSPADM

## 2025-06-22 RX ORDER — SODIUM CHLORIDE 0.9 % (FLUSH) 0.9 %
10 SYRINGE (ML) INJECTION EVERY 12 HOURS SCHEDULED
Status: DISCONTINUED | OUTPATIENT
Start: 2025-06-22 | End: 2025-06-22 | Stop reason: HOSPADM

## 2025-06-22 RX ORDER — CHLORAL HYDRATE 500 MG
1000 CAPSULE ORAL
COMMUNITY

## 2025-06-22 RX ADMIN — ASPIRIN 325 MG ORAL TABLET 325 MG: 325 PILL ORAL at 04:22

## 2025-06-22 RX ADMIN — POTASSIUM CHLORIDE 40 MEQ: 1500 TABLET, EXTENDED RELEASE ORAL at 04:22

## 2025-06-22 RX ADMIN — TETROFOSMIN 1 DOSE: 1.38 INJECTION, POWDER, LYOPHILIZED, FOR SOLUTION INTRAVENOUS at 13:46

## 2025-06-22 RX ADMIN — Medication 10 ML: at 09:59

## 2025-06-22 RX ADMIN — REGADENOSON 0.4 MG: 0.08 INJECTION, SOLUTION INTRAVENOUS at 13:45

## 2025-06-22 RX ADMIN — TETROFOSMIN 1 DOSE: 1.38 INJECTION, POWDER, LYOPHILIZED, FOR SOLUTION INTRAVENOUS at 11:20

## 2025-06-22 NOTE — PROGRESS NOTES
ED OBSERVATION PROGRESS/DISCHARGE SUMMARY    Date of Admission: 6/22/2025   LOS: 0 days   PCP: Estefany Dominique MD    Working diagnosis: Chest pain      Subjective     Hospital Outcome: Pending    Christina Traore is a 69 y.o. female who was admitted to the ED observation unit for further evaluation of chest pain.  Patient was reportedly getting ready for bed yesterday at 11 PM then began to have a midsternal chest pain radiating to her neck.  She reports the pain was associated with shortness of breath and feeling very weak.  No nausea or vomiting or diaphoresis.  Patient pain-free currently.  She does report feeling fatigued.  Cardiology to evaluate.  ECG is nonischemic, troponins have been flat at less than 6, CBC and CMP unremarkable.    7:03 PM.  Stress test back and shows no acute schema changes.  Awaiting the plan from cardiology.  Echocardiogram from December 2024 was unremarkable.    ROS:  General: no fevers, chills  Respiratory: no cough, dyspnea  Cardiovascular: Chest pain  Abdomen: No abdominal pain, nausea, vomiting, or diarrhea  Neurologic: No focal weakness    Objective   Physical Exam:  I have reviewed the vital signs.  Temp:  [97.5 °F (36.4 °C)-98.1 °F (36.7 °C)] 97.7 °F (36.5 °C)  Heart Rate:  [56-68] 68  Resp:  [18-20] 18  BP: (131-150)/(77-88) 131/77  General Appearance:    Alert, cooperative, no distress  Head:    Normocephalic, atraumatic  Eyes:    Sclerae anicteric  Neck:   Supple, no mass  Lungs: Clear to auscultation bilaterally, respirations unlabored  Heart: Regular rate and rhythm, S1 and S2 normal, no murmur, rub or gallop  Abdomen:  Soft, nontender, bowel sounds active, nondistended  Extremities: No clubbing, cyanosis, or edema to lower extremities  Pulses:  2+ and symmetric in distal lower extremities  Skin: No rashes   Neurologic: Oriented x3, Normal strength to extremities    Results Review:    I have reviewed the labs, radiology results and diagnostic studies.    Results from last 7  days   Lab Units 06/21/25  2343   WBC 10*3/mm3 6.11   HEMOGLOBIN g/dL 12.5   HEMATOCRIT % 38.4   PLATELETS 10*3/mm3 193     Results from last 7 days   Lab Units 06/21/25  2343   SODIUM mmol/L 141   POTASSIUM mmol/L 3.5   CHLORIDE mmol/L 106   CO2 mmol/L 21.0*   BUN mg/dL 15.0   CREATININE mg/dL 0.82   CALCIUM mg/dL 9.2   BILIRUBIN mg/dL 0.6   ALK PHOS U/L 129*   ALT (SGPT) U/L 18   AST (SGOT) U/L 23   GLUCOSE mg/dL 103*     Imaging Results (Last 24 Hours)       Procedure Component Value Units Date/Time    XR Chest 1 View [[id number]] Collected: 06/22/25 0054     Updated: 06/22/25 0058    Narrative:      SINGLE VIEW OF THE CHEST     HISTORY: Chest pain     COMPARISON: January 22, 2018     FINDINGS:  There is cardiomegaly. There is no vascular congestion. No pneumothorax,  pleural effusion, or acute infiltrate is seen.       Impression:      No acute findings.     This report was finalized on 6/22/2025 12:55 AM by Dr. Aislinn Cifuentes M.D on Workstation: [id number]               I have reviewed the medications.     Discharge Medications        ASK your doctor about these medications        Instructions Start Date   atorvastatin 10 MG tablet  Commonly known as: LIPITOR   10 mg, Daily      busPIRone 15 MG tablet  Commonly known as: BUSPAR   15 mg, 3 Times Daily      Claritin 10 MG tablet  Generic drug: loratadine   10 mg, Oral, Daily      cyclobenzaprine 10 MG tablet  Commonly known as: FLEXERIL   10 mg, Oral, 3 Times Daily      escitalopram 5 MG tablet  Commonly known as: LEXAPRO   5 mg, Daily      fish oil 1000 MG capsule capsule   1,000 mg, Daily With Breakfast      HYDROcodone-acetaminophen 5-325 MG per tablet  Commonly known as: NORCO   1 tablet, Oral, Every 6 Hours PRN      meclizine 25 MG tablet  Commonly known as: ANTIVERT   12.5 mg, Oral, 3 Times Daily PRN      multivitamin with minerals tablet tablet   1 tablet, Daily      Potassium & Magnesium Aspartat 250-250 MG capsule   3 Times Weekly               ---------------------------------------------------------------------------------------------  Assessment & Plan   Assessment/Problem List    Chest pain      Plan:    Chest pain   -Cardiac monitoring  -Vital signs every 4 hours   -Cardiology consult   -High-sensitivity troponin less than 6 x 2, trend  -EKG-sinus rhythm, rate 64  -N.p.o. after midnight   - Echocardiogram done 12/18/2024 and was within normal limits  -Radiology currently following        Anxiety/depression  - Continue home dose Lexapro     VTE Prophylaxis:  Mechanical VTE prophylaxis orders are present.       Disposition: Pending    Follow-up after Discharge: Pending    This note will serve as a progress note    Skyler Clayton III, PA 06/22/25 19:04 EDT    I have worn appropriate PPE during this patient encounter, sanitized my hands both with entering and exiting patient's room.      50 minutes has been spent by [facility] providers in the care of this patient while under observation status on this date 06/22/25

## 2025-06-22 NOTE — PLAN OF CARE
Goal Outcome Evaluation:      Pt admitted to obs for chest pain. She says that it isn't really pain but just a slight pressure. She also complains of intermittent dizziness w/ activity. A&O x4. RA. NSR. Standby assist. Cardiology consulted. NPO.

## 2025-06-22 NOTE — PLAN OF CARE
Goal Outcome Evaluation:              Outcome Evaluation: Pt. waiting for stress test result.

## 2025-06-22 NOTE — H&P
[facility]   HISTORY AND PHYSICAL    Patient Name: Christina Traore  : 1955  MRN: [id number]  Primary Care Physician:  Estefany Dominique MD  Date of admission: 2025    Patient Care Team:  Estefany Dominique MD as PCP - General (Family Medicine)       Subjective   Subjective     Chief Complaint:   Chief Complaint   Patient presents with    Chest Pain         HPI:    Christina Traore is a 69 y.o. female, with a past medical history including, but not limited to, BPH, anxiety, depression, was admitted to the observation unit with a complaint of chest pain.  She states that she was getting ready for bed around 11 PM last night when she began having midsternal chest pressure that radiated to her neck.  It is accompanied by feeling of being very weak and short of breath.  She denies any nausea, vomiting, or diaphoresis with the pain.  Pain was alleviated with time.  She complains of just being fatigued at this time.  But denies pain.  Cardiology has been consulted to see the patient this a.m.    Review of Systems   All systems were reviewed and negative except for: What was mentioned above in the HPI.    Personal History     Past Medical History:   Diagnosis Date    Benign paroxysmal positional vertigo 2017    Meniere's disease 2014    Migratory polyarthritis 2016    Mixed anxiety and depressive disorder 2014    Vertigo        Past Surgical History:   Procedure Laterality Date    BREAST SURGERY      FOOT SURGERY Right     HYSTERECTOMY         Family History: family history is not on file. Otherwise pertinent FHx was reviewed and not pertinent to current issue.    Social History:  reports that she has never smoked. She has never been exposed to tobacco smoke. She has never used smokeless tobacco. She reports that she does not drink alcohol and does not use drugs.    Home Medications:  HYDROcodone-acetaminophen, atorvastatin, busPIRone, cyclobenzaprine, escitalopram, and  meclizine    Allergies:  No Known Allergies    Objective   Objective     Vitals:   Temp:  [97.6 °F (36.4 °C)] 97.6 °F (36.4 °C)  Heart Rate:  [61-65] 61  Resp:  [18-20] 18  BP: (142-150)/(83-88) 150/83  Physical Exam    Constitutional: Awake, alert   Eyes: PERRLA, sclerae anicteric, no conjunctival injection   HENT: NCAT, mucous membranes moist   Neck: Supple, no thyromegaly, no lymphadenopathy, trachea midline   Respiratory: Clear to auscultation bilaterally, nonlabored respirations    Cardiovascular: RRR, no murmurs, rubs, or gallops, palpable pedal pulses bilaterally   Gastrointestinal: Positive bowel sounds, soft, nontender, nondistended   Musculoskeletal: No bilateral ankle edema, no clubbing or cyanosis to extremities   Psychiatric: Appropriate affect, cooperative   Neurologic: Oriented x 3, strength symmetric in all extremities, Cranial Nerves grossly intact to confrontation, speech clear   Skin: No rashes     Result Review    Result Review:  I have personally reviewed the results from the time of this admission to 6/22/2025 04:13 EDT and agree with these findings:  [x]  Laboratory list / accordion  []  Microbiology  [x]  Radiology  [x]  EKG/Telemetry   []  Cardiology/Vascular   []  Pathology  [x]  Old records  []  Other:    Initial workup in the emergency department shows high-sensitivity troponin of less than 6 x 2.      The 10-year ASCVD risk score (Katherine DK, et al., 2019) is: 11.9%    Values used to calculate the score:      Age: 69 years      Sex: Female      Is Non- : Yes      Diabetic: No      Tobacco smoker: No      Systolic Blood Pressure: 150 mmHg      Is BP treated: No      HDL Cholesterol: 59 mg/dL      Total Cholesterol: 157 mg/dL     Assessment & Plan   Assessment / Plan     Brief Patient Summary:  Christina Traore is a 69 y.o. female who was admitted to the observation unit for further evaluation and treatment of her chest pain.  Cardiology has been consulted to see  the patient in the AM.  All of the lab work is at baseline for the patient.  EKG shows sinus rhythm, rate 64.  Chest x-ray shows no acute findings.    Active Hospital Problems:  Active Hospital Problems    Diagnosis     **Chest pain      Plan:     Chest pain   -Cardiac monitoring  -Vital signs every 4 hours   -Cardiology consult   -High-sensitivity troponin less than 6 x 2, trend  -EKG-sinus rhythm, rate 64  -N.p.o. after midnight   - Echocardiogram done 12/18/2024 and was within normal limits      Anxiety/depression  - Continue home dose Lexapro    VTE Prophylaxis:  Mechanical VTE prophylaxis orders are present.        CODE STATUS:    Code Status (Patient has no pulse and is not breathing): CPR (Attempt to Resuscitate)  Medical Interventions (Patient has pulse or is breathing): Full Support    Admission Status:  I believe this patient meets observation status.    75 minutes have been spent by [facility] providers in the care of this patient while under observation status on 06/22/25 .      Appropriate PPE worn during patient encounter.  Hand hygeine performed before and after seeing the patient.      Electronically signed by NAYA Antonio, 06/22/25, 4:13 AM EDT.

## 2025-06-22 NOTE — CONSULTS
Referring Provider:       Patient Care Team:  Estefany Dominique MD as PCP - General (Family Medicine)      Reason for Consultation:   Chest pain    History of present illness: 69-year-old female with benign positional vertigo, anxiety, depression, rheumatoid arthritis, and essential hypertension who presented with report of chest pain.  Per her report she was getting ready for bed around 11 PM last night and felt midsternal chest pressure that radiated to her neck.  She also had weakness and some shortness of breath at that time.  She no longer has chest pain and just complains of being fatigued.  Her blood pressure has been at most mildly elevated.  Her lab work here shows negative high-sensitivity troponin x 2.  Electrocardiogram was performed which I have reviewed.  This shows a sinus rhythm with a left axis deviation, and nonspecific T wave abnormalities isolated to lead III    Review of Systems  All other systems reviewed and negative.     Past Medical History:   Past Medical History:   Diagnosis Date    Benign paroxysmal positional vertigo 08/04/2017    Meniere's disease 11/20/2014    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    Mixed anxiety and depressive disorder 07/18/2014    Vertigo        Past Surgical History:   Past Surgical History:   Procedure Laterality Date    BREAST SURGERY      FOOT SURGERY Right     HYSTERECTOMY         Family History: History reviewed. No pertinent family history.    Social History:   Social History     Tobacco Use    Smoking status: Never     Passive exposure: Never    Smokeless tobacco: Never   Vaping Use    Vaping status: Never Used   Substance Use Topics    Alcohol use: No    Drug use: No       Home Medications:   Medications Prior to Admission   Medication Sig Dispense Refill Last Dose/Taking    atorvastatin (LIPITOR) 10 MG tablet Take 1 tablet by mouth Daily.   6/21/2025 Evening    busPIRone (BUSPAR) 15 MG tablet Take 1 tablet by mouth 3 (Three) Times a Day.   6/21/2025 Evening     Mag Aspart-Potassium Aspart (Potassium & Magnesium Aspartat) 250-250 MG capsule Take  by mouth 3 (Three) Times a Week.   6/20/2025    multivitamin with minerals (MULTIVITAMIN ADULT PO) Take 1 tablet by mouth Daily.   6/21/2025 Morning    Omega-3 Fatty Acids (fish oil) 1000 MG capsule capsule Take 1 capsule by mouth Daily With Breakfast.   6/21/2025 Morning    cyclobenzaprine (FLEXERIL) 10 MG tablet Take 1 tablet by mouth 3 (Three) Times a Day. 90 tablet 0     escitalopram (LEXAPRO) 5 MG tablet Take 1 tablet by mouth Daily.       HYDROcodone-acetaminophen (NORCO) 5-325 MG per tablet Take 1 tablet by mouth Every 6 (Six) Hours As Needed for Moderate Pain . 20 tablet 0     loratadine (Claritin) 10 MG tablet Take 1 tablet by mouth Daily.   6/20/2025    meclizine (ANTIVERT) 25 MG tablet Take 0.5 tablets by mouth 3 (Three) Times a Day As Needed for Dizziness. 20 tablet 0        Current Medications:   Current Facility-Administered Medications:     atorvastatin (LIPITOR) tablet 10 mg, 10 mg, Oral, Nightly, Dinorah Oliveira APRN    sennosides-docusate (PERICOLACE) 8.6-50 MG per tablet 2 tablet, 2 tablet, Oral, BID PRN **AND** polyethylene glycol (MIRALAX) packet 17 g, 17 g, Oral, Daily PRN **AND** bisacodyl (DULCOLAX) EC tablet 5 mg, 5 mg, Oral, Daily PRN **AND** bisacodyl (DULCOLAX) suppository 10 mg, 10 mg, Rectal, Daily PRN, Dinorah Oliveira APRN    busPIRone (BUSPAR) tablet 15 mg, 15 mg, Oral, TID, Dinorah Oliveira APRN    Calcium Replacement - Follow Nurse / BPA Driven Protocol, , Not Applicable, PRN, Dinorah Oliveira APRN    escitalopram (LEXAPRO) tablet 5 mg, 5 mg, Oral, Daily, Dinorah Oliveira, APRN    Magnesium Standard Dose Replacement - Follow Nurse / BPA Driven Protocol, , Not Applicable, PRN, Dinorah Oliveira, APRN    nitroglycerin (NITROSTAT) SL tablet 0.4 mg, 0.4 mg, Sublingual, Q5 Min PRN, Dinorah Oliveira, NAYA    ondansetron ODT (ZOFRAN-ODT) disintegrating tablet 4 mg, 4 mg, Oral, Q6H PRN **OR** ondansetron (ZOFRAN)  "injection 4 mg, 4 mg, Intravenous, Q6H PRN, Tee, Dinorah S, APRN    Phosphorus Replacement - Follow Nurse / BPA Driven Protocol, , Not Applicable, PRN, Tee, Dinorah S, APRN    potassium chloride (KLOR-CON M20) CR tablet 40 mEq, 40 mEq, Oral, Q4H, Jose L Santana MD, 40 mEq at 06/22/25 0422    Potassium Replacement - Follow Nurse / BPA Driven Protocol, , Not Applicable, PRN, Tee, Dinorah S, APRN    sodium chloride 0.9 % flush 10 mL, 10 mL, Intravenous, PRN, Jojo Schaefer MD    sodium chloride 0.9 % flush 10 mL, 10 mL, Intravenous, Q12H, Tee, Dinorah S, APRN    sodium chloride 0.9 % flush 10 mL, 10 mL, Intravenous, PRN, Tee, Dinorah S, APRN    sodium chloride 0.9 % infusion 40 mL, 40 mL, Intravenous, PRN, Tee, Dinorah S, APRN     Allergies: Patient has no known allergies.      Vital Signs   Temp:  [97.5 °F (36.4 °C)-98.1 °F (36.7 °C)] 98.1 °F (36.7 °C)  Heart Rate:  [56-65] 56  Resp:  [18-20] 18  BP: (138-150)/(83-88) 138/84  Flowsheet Rows      Flowsheet Row First Filed Value   Admission Height 172.7 cm (68\") Documented at 06/21/2025 2329   Admission Weight 69.9 kg (154 lb) Documented at 06/21/2025 2329            General Appearance:    Alert, cooperative, in no acute distress   Head:    Normocephalic, without obvious abnormality, atraumatic       Neck:   No adenopathy, supple, no thyromegaly, no carotid bruit, no    JVD   Lungs:     Clear to auscultation bilaterally, no wheezes, rales, or     rhonchi    Heart:    Normal rate, regular rhythm, no murmur, no rub, no gallop   Chest Wall:    No abnormalities observed   Abdomen:     Normal bowel sounds, soft, nontender, nondistended,            no rebound tenderness   Extremities:   No cyanosis, clubbing, or edema   Pulses:   Pulses palpable and equal bilaterally   Skin:   No bleeding or rash   Lymph nodes:   No cervical adenopathy   Neurologic:   Cranial nerves 2 - 12 grossly intact, sensation intact               Results Review: I personally viewed and " interpreted the patient's EKG/Telemetry data    Results from last 7 days   Lab Units 06/21/25  2343   WBC 10*3/mm3 6.11   HEMOGLOBIN g/dL 12.5   HEMATOCRIT % 38.4   PLATELETS 10*3/mm3 193     Results from last 7 days   Lab Units 06/21/25  2343   SODIUM mmol/L 141   POTASSIUM mmol/L 3.5   CHLORIDE mmol/L 106   CO2 mmol/L 21.0*   BUN mg/dL 15.0   CREATININE mg/dL 0.82   GLUCOSE mg/dL 103*   CALCIUM mg/dL 9.2     Lab Results   Lab Value Date/Time    TROPONINT <6 06/22/2025 0213    TROPONINT <6 06/21/2025 2343    TROPONINT 6 12/18/2024 0406    TROPONINT <6 12/17/2024 1627    TROPONINT <6 12/17/2024 1451    TROPONINT <0.010 01/22/2018 1429           Assessment & Plan   1.  Chest pain  2.  Essential hypertension: Blood pressure mildly elevated  3.  BPPV  4.  Depression/anxiety    -I think the majority of her chest discomfort is driven by increased stress and anxiety of watching the news last night at about 11 PM.   - EKG has some nonspecific T wave changes in inferior lead.  Cardiac biomarkers are negative.  - Blood pressure is mildly elevated but acceptable.  - Echo from December 2024 is reviewed.  This was normal.  - Plan on Lexiscan stress    I discussed the patient's findings and my recommendations with the patient

## 2025-06-22 NOTE — NURSING NOTE
Pt awaiting IV placement before coming to obs. Received report from Eris who advised of trouble getting line and that the charge nurse is going to try to get a line on this pt. She will call me back to come once completed.

## 2025-06-22 NOTE — DISCHARGE SUMMARY
ED OBSERVATION PROGRESS/DISCHARGE SUMMARY    Date of Admission: 6/22/2025   LOS: 0 days   PCP: Estefany Dominique MD    Final Diagnosis atypical chest pain      Subjective     Hospital Outcome:   Christina Traore is a 69 y.o. female who was admitted to the ED observation unit for further evaluation of chest pain.  Patient was reportedly getting ready for bed yesterday at 11 PM then began to have a midsternal chest pain radiating to her neck.  She reports the pain was associated with shortness of breath and feeling very weak.  No nausea or vomiting or diaphoresis.  Patient pain-free currently.  She does report feeling fatigued.  Cardiology to evaluate.  ECG is nonischemic, troponins have been flat at less than 6, CBC and CMP unremarkable.     7:03 PM.  Stress test back and shows no acute schema changes.  Awaiting the plan from cardiology.  Echocardiogram from December 2024 was unremarkable.       1945: Patient requesting to go home tonight.  Discussed with Dr. Santana he is okay with that send stress test is negative.  Informed patient to follow-up with primary care in 1 to 2 weeks.  To follow-up with cardiology as needed.  Return to the emergency department for any concerns chest pain, shortness of breath, nausea, vomiting, diarrhea.  She voices understanding.    ROS:  General: no fevers, chills  Respiratory: no cough, dyspnea  Cardiovascular: no chest pain, palpitations  Abdomen: No abdominal pain, nausea, vomiting, or diarrhea  Neurologic: No focal weakness    Objective   Physical Exam:  I have reviewed the vital signs.  Temp:  [97.5 °F (36.4 °C)-98.1 °F (36.7 °C)] 97.7 °F (36.5 °C)  Heart Rate:  [56-68] 68  Resp:  [18-20] 18  BP: (131-150)/(77-88) 131/77  General Appearance:    Alert, cooperative, no distress  Head:    Normocephalic, atraumatic  Eyes:    Sclerae anicteric  Neck:   Supple, no mass  Lungs: Clear to auscultation bilaterally, respirations unlabored  Heart: Regular rate and rhythm, S1 and S2 normal, no  murmur, rub or gallop  Abdomen:  Soft, nontender, bowel sounds active, nondistended  Extremities: No clubbing, cyanosis, or edema to lower extremities  Pulses:  2+ and symmetric in distal lower extremities  Skin: No rashes   Neurologic: Oriented x3, Normal strength to extremities    Results Review:    I have reviewed the labs, radiology results and diagnostic studies.    Results from last 7 days   Lab Units 06/21/25  2343   WBC 10*3/mm3 6.11   HEMOGLOBIN g/dL 12.5   HEMATOCRIT % 38.4   PLATELETS 10*3/mm3 193     Results from last 7 days   Lab Units 06/21/25  2343   SODIUM mmol/L 141   POTASSIUM mmol/L 3.5   CHLORIDE mmol/L 106   CO2 mmol/L 21.0*   BUN mg/dL 15.0   CREATININE mg/dL 0.82   CALCIUM mg/dL 9.2   BILIRUBIN mg/dL 0.6   ALK PHOS U/L 129*   ALT (SGPT) U/L 18   AST (SGOT) U/L 23   GLUCOSE mg/dL 103*     Imaging Results (Last 24 Hours)       Procedure Component Value Units Date/Time    XR Chest 1 View [[id number]] Collected: 06/22/25 0054     Updated: 06/22/25 0058    Narrative:      SINGLE VIEW OF THE CHEST     HISTORY: Chest pain     COMPARISON: January 22, 2018     FINDINGS:  There is cardiomegaly. There is no vascular congestion. No pneumothorax,  pleural effusion, or acute infiltrate is seen.       Impression:      No acute findings.     This report was finalized on 6/22/2025 12:55 AM by Dr. Aislinn Cifuentes M.D on Workstation: [id number]               I have reviewed the medications.     Discharge Medications        Continue These Medications        Instructions Start Date   atorvastatin 10 MG tablet  Commonly known as: LIPITOR   10 mg, Daily      busPIRone 15 MG tablet  Commonly known as: BUSPAR   15 mg, 3 Times Daily      Claritin 10 MG tablet  Generic drug: loratadine   10 mg, Oral, Daily      cyclobenzaprine 10 MG tablet  Commonly known as: FLEXERIL   10 mg, Oral, 3 Times Daily      escitalopram 5 MG tablet  Commonly known as: LEXAPRO   5 mg, Daily      fish oil 1000 MG capsule capsule   1,000 mg,  Daily With Breakfast      meclizine 25 MG tablet  Commonly known as: ANTIVERT   12.5 mg, Oral, 3 Times Daily PRN      multivitamin with minerals tablet tablet   1 tablet, Daily      Potassium & Magnesium Aspartat 250-250 MG capsule   3 Times Weekly             Stop These Medications      HYDROcodone-acetaminophen 5-325 MG per tablet  Commonly known as: NORCO             ---------------------------------------------------------------------------------------------  Assessment & Plan   Assessment/Problem List    Chest pain      Plan:  Chest pain   -Cardiac monitoring  -Vital signs every 4 hours   -Cardiology consult   -High-sensitivity troponin less than 6 x 2, trend  -EKG-sinus rhythm, rate 64  -N.p.o. after midnight   - Echocardiogram done 12/18/2024 and was within normal limits  -Stress test with myocardial perfusion-consistent with low risk study        Anxiety/depression  - Continue home dose Lexapro     VTE Prophylaxis:  Mechanical VTE prophylaxis orders are present.       Disposition: Home    Follow-up after Discharge: PCP 1 to 2 weeks, cardiology as needed    This note will serve as a discharge summary    Dinorah Oliveira, APRN 06/22/25 19:43 EDT    I have worn appropriate PPE during this patient encounter, sanitized my hands both with entering and exiting patient's room.      30 minutes has been spent by [facility] providers in the care of this patient while under observation status on this date 06/22/25

## 2025-06-22 NOTE — ED PROVIDER NOTES
I have personally performed a face-to-face diagnostic evaluation of the patient.  I have reviewed and agree with the care plan as outlined by NP/PA.  My findings are as follows:    HPI:  Patient is a 69 y.o. female who presents with episode of chest pain.  Symptoms started about 11 PM while she was watching TV, but has subsequently subsided.  She is not having any pain at the time of my evaluation.  She is wondering if this was just her anxiety.  She reports a history of hyperlipidemia and family history of coronary artery disease.  No history of hypertension, diabetes and she is a non-smoker.      PE:  Physical Exam  Constitutional:       Appearance: Normal appearance.   HENT:      Head: Normocephalic and atraumatic.   Eyes:      Pupils: Pupils are equal, round, and reactive to light.   Cardiovascular:      Rate and Rhythm: Normal rate and regular rhythm.      Heart sounds: No murmur heard.  Abdominal:      Tenderness: There is no abdominal tenderness. There is no guarding or rebound.   Skin:     General: Skin is warm.   Neurological:      Mental Status: She is alert and oriented to person, place, and time.           MDM:  I provided a substantiate portion of the care of this patient. I personally performed the medical decision making.    Medical records are reviewed in [facility] and Care Everywhere, if applicable    EKG Interpreted by me: Sinus rhythm, borderline left axis deviation, not significantly changed from previous    Recent Results (from the past 24 hours)   High Sensitivity Troponin T 1Hr    Collection Time: 06/22/25  2:13 AM    Specimen: Arm, Left; Blood   Result Value Ref Range    HS Troponin T <6 <14 ng/L    Troponin T Numeric Delta     Stress Test With Myocardial Perfusion One Day    Collection Time: 06/22/25  1:45 PM   Result Value Ref Range     CV STRESS PROTOCOL 1 Pharmacologic     Stage 1 1.0     Duration Min Stage 1 0     Duration Sec Stage 1 10     Stress Dose Regadenoson Stage 1 0.40     Stress  Comments Stage 1 10 sec bolus injection     Baseline HR 56 bpm    Baseline /91 mmHg    Peak HR 93 bpm    Peak /69 mmHg    Recovery HR 69 bpm    Recovery /68 mmHg    Target HR (85%) 128 bpm    Max. Pred. HR (100%) 151 bpm    Percent Max Pred HR 61.59 %    Percent Target HR 72 %    BH CV REST NUCLEAR ISOTOPE DOSE 9.9 mCi    BH CV STRESS NUCLEAR ISOTOPE DOSE 29.9 mCi    Nuc Stress EF 60 %     I have reviewed the above labs    Stress Test With Myocardial Perfusion One Day  Result Date: 6/22/2025    Findings consistent with a normal ECG stress test.   Myocardial perfusion imaging indicates a normal myocardial perfusion study with no evidence of ischemia. Impressions are consistent with a low risk study.   Left ventricular ejection fraction is normal (Calculated EF = 60%).       My independent interpretation of the cxr: No acute process    This is a 69-year-old female who is presenting today secondary to an episode of now resolved chest pain.  She is very worried about her symptoms given her brother's passing at a young age from similar symptoms.  She presents afebrile with overall unremarkable vital signs.  Her cardiopulmonary exam is overall benign.    She was evaluated with an EKG, which did not demonstrate any acute ischemic findings.  This is coupled with 2 times negative symptoms in the setting of a moderate risk heart score.  Further workup may be indicated.  HEART Score: 5    She otherwise has a Wells score of 0 and PE is not suspected  Wells' Criteria for Pulmonary Embolism - MDCalc  Calculated on Jun 23 2025 12:37 AM  0.0 points -> Low risk group: 1.3% chance of PE in an ED population. Another study assigned scores <= 4 as “PE Unlikely” and had a 3% incidence of PE.    Chest x-ray did not demonstrate any other acute cardiopulmonary process as emergent cause of symptoms including pneumomediastinum, widened mediastinum, acute infiltrate or pneumothorax.    Overall, given age, presenting symptoms  and no recent cardiac evaluation, will admit to observation for further care and management      Impression:  Final diagnoses:   Chest pain, unspecified type         Disposition:  ED Disposition       ED Disposition   Decision to Admit    Condition   --    Comment   --                Jojo Schaefer MD  06/23/25 0037

## 2025-06-22 NOTE — ED PROVIDER NOTES
EMERGENCY DEPARTMENT ENCOUNTER  Room Number:  05/05  PCP: Estefany Dominique MD  Independent Historians: Historian: Patient      HPI:  Chief Complaint: had concerns including Chest Pain.     A complete HPI/ROS/PMH/PSH/SH/FH are unobtainable due to: EM_Unobtainable History : None    Chronic or social conditions impacting patient care (Social Determinants of Health): None      Context: The patient is a 69 y.o. female with a medical history of seizure, syncope, poor short-term memory, hyperlipidemia, BPPV who presents to the ED c/o acute chest pressure that started around 11 PM while watching the news.  She states it is constant, waxes and wanes, nothing makes it worse or better.  It is not exertional or pleuritic.  She does have some generalized weakness/fatigue associated with it.  She denies any shortness of breath nausea vomiting syncope.  She has a history of hyperlipidemia, recently noted to be prediabetic and states her brother passed away at age 44 from an MI.  She has not had any significant cardiac testing since 1999 per her report      Review of prior external notes (non-ED) -and- Review of prior external test results outside of this encounter: Echo performed 12/18/2024 and LVEF was 66 to 70%, diastolic function was normal    Labs performed 12/18/2024 and creatinine was 0.7, sodium 143, hemoglobin 11.8      PAST MEDICAL HISTORY  Active Ambulatory Problems     Diagnosis Date Noted    Seizure 08/14/2023    Syncope and collapse 09/13/2023    Rheumatoid factor positive 12/22/2016    Poor short-term memory 02/05/2015    Multiple joint pain 12/18/2020    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    Meniere's disease 11/20/2014    Low back pain 06/02/2020    Hyperlipidemia 09/26/2013    Esophageal reflux 12/22/2015    Mixed anxiety and depressive disorder 07/18/2014    Depressive disorder 08/14/2023    Anxiety and depression 05/12/2014    Benign paroxysmal positional vertigo 08/04/2017    Osteoarthritis 05/05/2015     Anterior knee pain 12/22/2016    Allergic rhinitis 05/16/2014     Resolved Ambulatory Problems     Diagnosis Date Noted    Vertigo 12/17/2024     No Additional Past Medical History         PAST SURGICAL HISTORY  Past Surgical History:   Procedure Laterality Date    BREAST SURGERY      FOOT SURGERY Right     HYSTERECTOMY           FAMILY HISTORY  No family history on file.      SOCIAL HISTORY  Social History     Socioeconomic History    Marital status: Single   Tobacco Use    Smoking status: Never     Passive exposure: Never    Smokeless tobacco: Never   Vaping Use    Vaping status: Never Used   Substance and Sexual Activity    Alcohol use: No    Drug use: No    Sexual activity: Defer         ALLERGIES  Patient has no known allergies.      REVIEW OF SYSTEMS  Review of Systems  Included in HPI  All systems reviewed and negative except for those discussed in HPI.      PHYSICAL EXAM    I have reviewed the triage vital signs and nursing notes.    ED Triage Vitals   Temp Heart Rate Resp BP SpO2   06/22/25 0003 06/21/25 2329 06/21/25 2329 06/22/25 0003 06/21/25 2329   97.6 °F (36.4 °C) 65 20 142/88 100 %      Temp src Heart Rate Source Patient Position BP Location FiO2 (%)   -- -- -- -- --              Physical Exam  GENERAL: alert, no acute distress  SKIN: Warm, dry  HENT: Normocephalic, atraumatic  EYES: no scleral icterus  CV: regular rhythm, regular rate, no lower extremity edema  RESPIRATORY: normal effort, lungs clear  ABDOMEN: nondistended nontender  MUSCULOSKELETAL: no deformity  NEURO: alert, moves all extremities, follows commands            LAB RESULTS  Recent Results (from the past 24 hours)   ECG 12 Lead ED Triage Standing Order; Chest Pain    Collection Time: 06/21/25 11:34 PM   Result Value Ref Range    QT Interval 456 ms    QTC Interval 470 ms   Comprehensive Metabolic Panel    Collection Time: 06/21/25 11:43 PM    Specimen: Arm, Left; Blood   Result Value Ref Range    Glucose 103 (H) 65 - 99 mg/dL    BUN  15.0 8.0 - 23.0 mg/dL    Creatinine 0.82 0.57 - 1.00 mg/dL    Sodium 141 136 - 145 mmol/L    Potassium 3.5 3.5 - 5.2 mmol/L    Chloride 106 98 - 107 mmol/L    CO2 21.0 (L) 22.0 - 29.0 mmol/L    Calcium 9.2 8.6 - 10.5 mg/dL    Total Protein 7.5 6.0 - 8.5 g/dL    Albumin 4.2 3.5 - 5.2 g/dL    ALT (SGPT) 18 1 - 33 U/L    AST (SGOT) 23 1 - 32 U/L    Alkaline Phosphatase 129 (H) 39 - 117 U/L    Total Bilirubin 0.6 0.0 - 1.2 mg/dL    Globulin 3.3 gm/dL    A/G Ratio 1.3 g/dL    BUN/Creatinine Ratio 18.3 7.0 - 25.0    Anion Gap 14.0 5.0 - 15.0 mmol/L    eGFR 77.5 >60.0 mL/min/1.73   High Sensitivity Troponin T    Collection Time: 06/21/25 11:43 PM    Specimen: Arm, Left; Blood   Result Value Ref Range    HS Troponin T <6 <14 ng/L   Green Top (Gel)    Collection Time: 06/21/25 11:43 PM   Result Value Ref Range    Extra Tube Hold for add-ons.    Lavender Top    Collection Time: 06/21/25 11:43 PM   Result Value Ref Range    Extra Tube hold for add-on    Gold Top - SST    Collection Time: 06/21/25 11:43 PM   Result Value Ref Range    Extra Tube Hold for add-ons.    Light Blue Top    Collection Time: 06/21/25 11:43 PM   Result Value Ref Range    Extra Tube Hold for add-ons.    CBC Auto Differential    Collection Time: 06/21/25 11:43 PM    Specimen: Arm, Left; Blood   Result Value Ref Range    WBC 6.11 3.40 - 10.80 10*3/mm3    RBC 4.23 3.77 - 5.28 10*6/mm3    Hemoglobin 12.5 12.0 - 15.9 g/dL    Hematocrit 38.4 34.0 - 46.6 %    MCV 90.8 79.0 - 97.0 fL    MCH 29.6 26.6 - 33.0 pg    MCHC 32.6 31.5 - 35.7 g/dL    RDW 13.1 12.3 - 15.4 %    RDW-SD 43.1 37.0 - 54.0 fl    MPV 10.4 6.0 - 12.0 fL    Platelets 193 140 - 450 10*3/mm3    Neutrophil % 54.1 42.7 - 76.0 %    Lymphocyte % 34.9 19.6 - 45.3 %    Monocyte % 8.0 5.0 - 12.0 %    Eosinophil % 2.5 0.3 - 6.2 %    Basophil % 0.3 0.0 - 1.5 %    Immature Grans % 0.2 0.0 - 0.5 %    Neutrophils, Absolute 3.31 1.70 - 7.00 10*3/mm3    Lymphocytes, Absolute 2.13 0.70 - 3.10 10*3/mm3     Monocytes, Absolute 0.49 0.10 - 0.90 10*3/mm3    Eosinophils, Absolute 0.15 0.00 - 0.40 10*3/mm3    Basophils, Absolute 0.02 0.00 - 0.20 10*3/mm3    Immature Grans, Absolute 0.01 0.00 - 0.05 10*3/mm3    nRBC 0.0 0.0 - 0.2 /100 WBC   High Sensitivity Troponin T 1Hr    Collection Time: 06/22/25  2:13 AM    Specimen: Arm, Left; Blood   Result Value Ref Range    HS Troponin T <6 <14 ng/L    Troponin T Numeric Delta           RADIOLOGY  XR Chest 1 View  Result Date: 6/22/2025  SINGLE VIEW OF THE CHEST  HISTORY: Chest pain  COMPARISON: January 22, 2018  FINDINGS: There is cardiomegaly. There is no vascular congestion. No pneumothorax, pleural effusion, or acute infiltrate is seen.      No acute findings.  This report was finalized on 6/22/2025 12:55 AM by Dr. Aislinn Cifuentes M.D on Workstation: [id number]          MEDICATIONS GIVEN IN ER  Medications   sodium chloride 0.9 % flush 10 mL (has no administration in time range)   aspirin tablet 325 mg (has no administration in time range)   sodium chloride 0.9 % flush 10 mL (has no administration in time range)   sodium chloride 0.9 % flush 10 mL (has no administration in time range)   sodium chloride 0.9 % infusion 40 mL (has no administration in time range)   ondansetron ODT (ZOFRAN-ODT) disintegrating tablet 4 mg (has no administration in time range)     Or   ondansetron (ZOFRAN) injection 4 mg (has no administration in time range)   nitroglycerin (NITROSTAT) SL tablet 0.4 mg (has no administration in time range)   Potassium Replacement - Follow Nurse / BPA Driven Protocol (has no administration in time range)   Magnesium Standard Dose Replacement - Follow Nurse / BPA Driven Protocol (has no administration in time range)   Phosphorus Replacement - Follow Nurse / BPA Driven Protocol (has no administration in time range)   Calcium Replacement - Follow Nurse / BPA Driven Protocol (has no administration in time range)   sennosides-docusate (PERICOLACE) 8.6-50 MG per tablet  2 tablet (has no administration in time range)     And   polyethylene glycol (MIRALAX) packet 17 g (has no administration in time range)     And   bisacodyl (DULCOLAX) EC tablet 5 mg (has no administration in time range)     And   bisacodyl (DULCOLAX) suppository 10 mg (has no administration in time range)         ORDERS PLACED DURING THIS VISIT:  Orders Placed This Encounter   Procedures    XR Chest 1 View    [place] Draw    Comprehensive Metabolic Panel    High Sensitivity Troponin T    CBC Auto Differential    High Sensitivity Troponin T 1Hr    NPO Diet NPO Type: Sips with Meds    Undress & Gown    Intake & Output    Weigh Patient    Oral Care    Maintain IV Access    Telemetry - Place Orders & Notify Provider of Results When Patient Experiences Acute Chest Pain, Dysrhythmia or Respiratory Distress    May Be Off Telemetry for Tests    Vital Signs    Continuous Pulse Oximetry    Up With Assistance    Code Status and Medical Interventions: CPR (Attempt to Resuscitate); Full Support    Inpatient Cardiology Consult    Oxygen Therapy- Nasal Cannula; Titrate 1-6 LPM Per SpO2; 90 - 95%    ECG 12 Lead ED Triage Standing Order; Chest Pain    ECG 12 Lead ED Triage Standing Order; Chest Pain    Insert Peripheral IV    Insert Peripheral IV    Initiate Emergency Department Observation Status    CBC & Differential    Green Top (Gel)    Lavender Top    Gold Top - SST    Light Blue Top         OUTPATIENT MEDICATION MANAGEMENT:  Current Facility-Administered Medications Ordered in Epic   Medication Dose Route Frequency Provider Last Rate Last Admin    aspirin tablet 325 mg  325 mg Oral Once Jojo Schaefer MD        sennosides-docusate (PERICOLACE) 8.6-50 MG per tablet 2 tablet  2 tablet Oral BID PRN Dinorah Oliveira S, APRN        And    polyethylene glycol (MIRALAX) packet 17 g  17 g Oral Daily PRN Dinorah Oliveira, APRN        And    bisacodyl (DULCOLAX) EC tablet 5 mg  5 mg Oral Daily PRN Dinorah Oliveira S, APRN        And     bisacodyl (DULCOLAX) suppository 10 mg  10 mg Rectal Daily PRN Dinorah Oliveira APRN        Calcium Replacement - Follow Nurse / BPA Driven Protocol   Not Applicable PRN Dinorah Oliveira APRN        Magnesium Standard Dose Replacement - Follow Nurse / BPA Driven Protocol   Not Applicable PRN Dinorah Oliveira APRMOHIT        nitroglycerin (NITROSTAT) SL tablet 0.4 mg  0.4 mg Sublingual Q5 Min PRN Dinorah Oliveira APRN        ondansetron ODT (ZOFRAN-ODT) disintegrating tablet 4 mg  4 mg Oral Q6H PRN Dinorah Oliveira APRN        Or    ondansetron (ZOFRAN) injection 4 mg  4 mg Intravenous Q6H PRN Dinorah Oliveira APRN        Phosphorus Replacement - Follow Nurse / BPA Driven Protocol   Not Applicable PRN Dinorah Oliveira APRN        Potassium Replacement - Follow Nurse / BPA Driven Protocol   Not Applicable PRN Dinorah Oliveira APRN        sodium chloride 0.9 % flush 10 mL  10 mL Intravenous PRN Jojo Schaefer MD        sodium chloride 0.9 % flush 10 mL  10 mL Intravenous Q12H Dinorah Oliveira APRN        sodium chloride 0.9 % flush 10 mL  10 mL Intravenous PRN Dinorah Oliveira APRN        sodium chloride 0.9 % infusion 40 mL  40 mL Intravenous PRN Dinorah Oliveira APRN         Current Outpatient Medications Ordered in Epic   Medication Sig Dispense Refill    atorvastatin (LIPITOR) 10 MG tablet Take 1 tablet by mouth Daily.      busPIRone (BUSPAR) 15 MG tablet Take 1 tablet by mouth 3 (Three) Times a Day.      cyclobenzaprine (FLEXERIL) 10 MG tablet Take 1 tablet by mouth 3 (Three) Times a Day. 90 tablet 0    escitalopram (LEXAPRO) 5 MG tablet Take 1 tablet by mouth Daily.      HYDROcodone-acetaminophen (NORCO) 5-325 MG per tablet Take 1 tablet by mouth Every 6 (Six) Hours As Needed for Moderate Pain . 20 tablet 0    meclizine (ANTIVERT) 25 MG tablet Take 0.5 tablets by mouth 3 (Three) Times a Day As Needed for Dizziness. 20 tablet 0         PROCEDURES  Procedures            PROGRESS, DATA ANALYSIS, CONSULTS, AND MEDICAL DECISION  MAKING  All labs have been independently interpreted by me.  All radiology studies have been reviewed by me. All EKG's have been independently viewed and interpreted by me.  Discussion below represents my analysis of pertinent findings related to patient's condition, differential diagnosis, treatment plan and final disposition.    DIFFERENTIAL    DDx includes but is not limited to acute coronary syndrome, pulmonary embolism, thoracic aortic dissection, pneumonia, pneumothorax, musculoskeletal pain, GERD or esophageal spasm, anxiety, myocarditis/pericarditis, esophageal rupture, pancreatitis.         Clinical Scores:                  ED Course as of 06/22/25 0411   Sun Jun 22, 2025   0324 WBC: 6.11 [KA]   0324 Hemoglobin: 12.5 [KA]   0324 Glucose(!): 103 [KA]   0324 Creatinine: 0.82 [KA]   0324 HS Troponin T: <6 [KA]   0351 My independent interpretation of the chest x-ray is no cardiomegaly or acute infiltrate [KA]   0409 My independent interpretation of the EKG performed at 2336  As sinus rhythm rate 64, normal axis, normal intervals, no ST elevation, no significant change from prior on December 18, 2024 [KA]   0410 Counseled the patient on her lab and imaging results.  She has had negative serial troponins, unremarkable EKG.  She still having some mild chest pain, does have a history of hyperlipidemia and a strong family history with brother who passed away at 44 of an MI, states she has not had a cardiac workup in the last 25 years.  We discussed admission versus discharge and she is agreeable with admission for further cardiac evaluation [KA]   0410 I discussed patient with NAYA Henderson for the ED observation unit including history presentation workup and she agrees to admit [KA]      ED Course User Index  [KA] Clarita Arevalo PA-C             BP - 150/83  HR - 61  TEMP - 97.6 °F (36.4 °C)  O2 SATS - 99%    COMPLEXITY OF CARE  The patient requires admission.      DIAGNOSIS  Final diagnoses:   Chest pain,  unspecified type         DISPOSITION  ED Disposition       ED Disposition   Decision to Admit    Condition   --    Comment   --                    Please note that portions of this document were completed with a voice recognition program.    Note Disclaimer: At [facility], we believe that sharing information builds trust and better relationships. You are receiving this note because you recently visited [facility]. It is possible you will see health information before a provider has talked with you about it. This kind of information can be easy to misunderstand. To help you fully understand what it means for your health, we urge you to discuss this note with your provider.         Clarita Arevalo PA-C  06/22/25 0548

## 2025-06-22 NOTE — ED NOTES
"..Nursing report ED to floor  Christina Traore  69 y.o.  female    HPI :  HPI  Stated Reason for Visit: patient from home with report of chest pain that started while she was laying in bed tonight.  History Obtained From: patient    Chief Complaint  Chief Complaint   Patient presents with    Chest Pain       Admitting doctor:   Jose L Santana MD    Admitting diagnosis:   The encounter diagnosis was Chest pain, unspecified type.    Code status:   Current Code Status       Date Active Code Status Order ID Comments User Context       6/22/2025 0410 CPR (Attempt to Resuscitate) [id number]  Dinorah Oliveira APRN ED        Question Answer    Code Status (Patient has no pulse and is not breathing) CPR (Attempt to Resuscitate)    Medical Interventions (Patient has pulse or is breathing) Full Support                    Allergies:   Patient has no known allergies.    Isolation:   No active isolations    Intake and Output  No intake or output data in the 24 hours ending 06/22/25 0412    Weight:       06/21/25 2329   Weight: 69.9 kg (154 lb)       Most recent vitals:   Vitals:    06/21/25 2329 06/22/25 0003 06/22/25 0405   BP:  142/88 150/83   Pulse: 65  61   Resp: 20  18   Temp:  97.6 °F (36.4 °C)    SpO2: 100%  99%   Weight: 69.9 kg (154 lb)     Height: 172.7 cm (68\")         Active LDAs/IV Access:   Lines, Drains & Airways       Active LDAs       None                    Labs (abnormal labs have a star):   Labs Reviewed   COMPREHENSIVE METABOLIC PANEL - Abnormal; Notable for the following components:       Result Value    Glucose 103 (*)     CO2 21.0 (*)     Alkaline Phosphatase 129 (*)     All other components within normal limits    Narrative:     GFR Categories in Chronic Kidney Disease (CKD)              GFR Category          GFR (mL/min/1.73)    Interpretation  G1                    90 or greater        Normal or high (1)  G2                    60-89                Mild decrease (1)  G3a                   45-59        "         Mild to moderate decrease  G3b                   30-44                Moderate to severe decrease  G4                    15-29                Severe decrease  G5                    14 or less           Kidney failure    (1)In the absence of evidence of kidney disease, neither GFR category G1 or G2 fulfill the criteria for CKD.    eGFR calculation 2021 CKD-EPI creatinine equation, which does not include race as a factor   TROPONIN - Normal    Narrative:     High Sensitive Troponin T Reference Range:  <14.0 ng/L- Negative Female for AMI  <22.0 ng/L- Negative Male for AMI  >=14 - Abnormal Female indicating possible myocardial injury.  >=22 - Abnormal Male indicating possible myocardial injury.   Clinicians would have to utilize clinical acumen, EKG, Troponin, and serial changes to determine if it is an Acute Myocardial Infarction or myocardial injury due to an underlying chronic condition.        CBC WITH AUTO DIFFERENTIAL - Normal   RAINBOW DRAW    Narrative:     The following orders were created for panel order [place] Draw.  Procedure                               Abnormality         Status                     ---------                               -----------         ------                     Green Top (Gel)[[id number]]                                  Final result               Lavender Top[[id number]]                                     Final result               Gold Top - SST[[id number]]                                   Final result               Light Blue Top[[id number]]                                   Final result                 Please view results for these tests on the individual orders.   HIGH SENSITIVITIY TROPONIN T 1HR    Narrative:     High Sensitive Troponin T Reference Range:  <14.0 ng/L- Negative Female for AMI  <22.0 ng/L- Negative Male for AMI  >=14 - Abnormal Female indicating possible myocardial injury.  >=22 - Abnormal Male indicating possible myocardial injury.   Clinicians would  have to utilize clinical acumen, EKG, Troponin, and serial changes to determine if it is an Acute Myocardial Infarction or myocardial injury due to an underlying chronic condition.        CBC AND DIFFERENTIAL    Narrative:     The following orders were created for panel order CBC & Differential.  Procedure                               Abnormality         Status                     ---------                               -----------         ------                     CBC Auto Differential[[id number]]        Normal              Final result                 Please view results for these tests on the individual orders.   GREEN TOP   LAVENDER TOP   GOLD TOP - SST   LIGHT BLUE TOP       EKG:   ECG 12 Lead ED Triage Standing Order; Chest Pain   Preliminary Result   HEART RATE=64  bpm   RR Ytmducpq=839  ms   WY Vumstkxr=888  ms   P Horizontal Axis=31  deg   P Front Axis=31  deg   QRSD Interval=99  ms   QT Hxwfgtmi=429  ms   ZGtT=212  ms   QRS Axis=-17  deg   T Wave Axis=18  deg   - ABNORMAL ECG -   Pacemaker spikes or artifacts   Sinus rhythm   Borderline left axis deviation   When compared with ECG of 18-Dec-2024 03:43:42,   Significant change in rhythm   Date and Time of Study:2025-06-21 23:34:45          Meds given in ED:   Medications   sodium chloride 0.9 % flush 10 mL (has no administration in time range)   aspirin tablet 325 mg (has no administration in time range)   sodium chloride 0.9 % flush 10 mL (has no administration in time range)   sodium chloride 0.9 % flush 10 mL (has no administration in time range)   sodium chloride 0.9 % infusion 40 mL (has no administration in time range)   ondansetron ODT (ZOFRAN-ODT) disintegrating tablet 4 mg (has no administration in time range)     Or   ondansetron (ZOFRAN) injection 4 mg (has no administration in time range)   nitroglycerin (NITROSTAT) SL tablet 0.4 mg (has no administration in time range)   Potassium Replacement - Follow Nurse / BPA Driven Protocol (has no  administration in time range)   Magnesium Standard Dose Replacement - Follow Nurse / BPA Driven Protocol (has no administration in time range)   Phosphorus Replacement - Follow Nurse / BPA Driven Protocol (has no administration in time range)   Calcium Replacement - Follow Nurse / BPA Driven Protocol (has no administration in time range)   sennosides-docusate (PERICOLACE) 8.6-50 MG per tablet 2 tablet (has no administration in time range)     And   polyethylene glycol (MIRALAX) packet 17 g (has no administration in time range)     And   bisacodyl (DULCOLAX) EC tablet 5 mg (has no administration in time range)     And   bisacodyl (DULCOLAX) suppository 10 mg (has no administration in time range)       Imaging results:  XR Chest 1 View  Result Date: 6/22/2025  No acute findings.  This report was finalized on 6/22/2025 12:55 AM by Dr. Aisilnn Cifuentes M.D on Workstation: [id number]        Ambulatory status:   - stand by     Social issues:   Social History     Socioeconomic History    Marital status: Single   Tobacco Use    Smoking status: Never     Passive exposure: Never    Smokeless tobacco: Never   Vaping Use    Vaping status: Never Used   Substance and Sexual Activity    Alcohol use: No    Drug use: No    Sexual activity: Defer       Peripheral Neurovascular  Peripheral Neurovascular (Adult)  Peripheral Neurovascular WDL: WDL    Neuro Cognitive  Neuro Cognitive (Adult)  Cognitive/Neuro/Behavioral WDL: WDL, orientation  Orientation: oriented x 4  Additional Documentation: [place] Coma Scale (Group)  [place] Coma Scale  Best Eye Response: 4-->(E4) spontaneous  Best Motor Response: 6-->(M6) obeys commands  Best Verbal Response: 5-->(V5) oriented  [place] Coma Scale Score: 15    Learning  Learning Assessment  Learning Readiness and Ability: no barriers identified  Education Provided  Person Taught: patient  Teaching Method: verbal instruction  Teaching Focus: symptom/problem overview  Education Outcome Evaluation:  acceptance expressed, able to teach back, verbalizes understanding    Respiratory  Respiratory  Airway WDL: WDL  Respiratory WDL  Respiratory WDL: WDL    Abdominal Pain  Safety Interventions  Safety Precautions/Falls Reduction: assistive device/personal items within reach  All Alarms: none present    Pain Assessments  Pain (Adult)  (0-10) Pain Rating: Rest: 10  Pain Location: chest    NIH Stroke Scale       Eris Ambriz RN  06/22/25 04:12 EDT

## 2025-06-23 NOTE — NURSING NOTE
Pt discharged at this time. Pt escorted to private vehicle at this time, taken via wheelchair, granddaughter to provide ride. Pt without complaints at this time, expresses gratitude for timely discharge. All questions answered, no distress noted.

## 2025-06-25 NOTE — CASE MANAGEMENT/SOCIAL WORK
Case Management Discharge Note      Final Note: Home via private vehicle         Selected Continued Care - Discharged on 6/22/2025 Admission date: 6/22/2025 - Discharge disposition: Home or Self Care      Destination    No services have been selected for the patient.                Durable Medical Equipment    No services have been selected for the patient.                Dialysis/Infusion    No services have been selected for the patient.                Home Medical Care    No services have been selected for the patient.                Therapy    No services have been selected for the patient.                Community Resources    No services have been selected for the patient.                Community & DME    No services have been selected for the patient.                    Transportation Services  Transportation: Private Transportation  Private: Car    Final Discharge Disposition Code: 01 - home or self-care